# Patient Record
Sex: FEMALE | Race: WHITE | NOT HISPANIC OR LATINO | Employment: OTHER | URBAN - METROPOLITAN AREA
[De-identification: names, ages, dates, MRNs, and addresses within clinical notes are randomized per-mention and may not be internally consistent; named-entity substitution may affect disease eponyms.]

---

## 2017-02-09 ENCOUNTER — INFUSION (OUTPATIENT)
Dept: ONCOLOGY | Facility: HOSPITAL | Age: 82
End: 2017-02-09

## 2017-02-09 VITALS
TEMPERATURE: 99.1 F | HEART RATE: 72 BPM | DIASTOLIC BLOOD PRESSURE: 57 MMHG | WEIGHT: 141 LBS | RESPIRATION RATE: 18 BRPM | SYSTOLIC BLOOD PRESSURE: 152 MMHG | BODY MASS INDEX: 22.08 KG/M2

## 2017-02-09 DIAGNOSIS — D64.9 ANEMIA, UNSPECIFIED TYPE: Primary | Chronic | ICD-10-CM

## 2017-02-09 LAB
ABO GROUP BLD: NORMAL
BLD GP AB SCN SERPL QL: NEGATIVE
RH BLD: NEGATIVE

## 2017-02-09 PROCEDURE — 36430 TRANSFUSION BLD/BLD COMPNT: CPT

## 2017-02-09 PROCEDURE — 96374 THER/PROPH/DIAG INJ IV PUSH: CPT

## 2017-02-09 PROCEDURE — 86901 BLOOD TYPING SEROLOGIC RH(D): CPT

## 2017-02-09 PROCEDURE — 86920 COMPATIBILITY TEST SPIN: CPT

## 2017-02-09 PROCEDURE — 86900 BLOOD TYPING SEROLOGIC ABO: CPT

## 2017-02-09 PROCEDURE — P9016 RBC LEUKOCYTES REDUCED: HCPCS

## 2017-02-09 PROCEDURE — 96375 TX/PRO/DX INJ NEW DRUG ADDON: CPT

## 2017-02-09 PROCEDURE — 25010000002 FUROSEMIDE PER 20 MG: Performed by: INTERNAL MEDICINE

## 2017-02-09 PROCEDURE — 86850 RBC ANTIBODY SCREEN: CPT

## 2017-02-09 RX ORDER — ACETAMINOPHEN 500 MG
1000 TABLET ORAL ONCE
Status: COMPLETED | OUTPATIENT
Start: 2017-02-09 | End: 2017-02-09

## 2017-02-09 RX ORDER — FUROSEMIDE 10 MG/ML
20 INJECTION INTRAMUSCULAR; INTRAVENOUS ONCE
Status: COMPLETED | OUTPATIENT
Start: 2017-02-09 | End: 2017-02-09

## 2017-02-09 RX ORDER — ACETAMINOPHEN 500 MG
1000 TABLET ORAL ONCE
Status: ACTIVE | OUTPATIENT
Start: 2017-02-09

## 2017-02-09 RX ORDER — FUROSEMIDE 10 MG/ML
20 INJECTION INTRAMUSCULAR; INTRAVENOUS ONCE
Status: DISCONTINUED | OUTPATIENT
Start: 2017-02-09 | End: 2017-08-18

## 2017-02-09 RX ADMIN — ACETAMINOPHEN 1000 MG: 500 TABLET, FILM COATED ORAL at 09:36

## 2017-02-09 RX ADMIN — FUROSEMIDE 20 MG: 10 INJECTION, SOLUTION INTRAMUSCULAR; INTRAVENOUS at 13:05

## 2017-02-10 LAB
ABO + RH BLD: NORMAL
ABO + RH BLD: NORMAL
BH BB BLOOD EXPIRATION DATE: NORMAL
BH BB BLOOD EXPIRATION DATE: NORMAL
BH BB BLOOD TYPE BARCODE: 9500
BH BB BLOOD TYPE BARCODE: 9500
BH BB DISPENSE STATUS: NORMAL
BH BB DISPENSE STATUS: NORMAL
BH BB PRODUCT CODE: NORMAL
BH BB PRODUCT CODE: NORMAL
BH BB UNIT NUMBER: NORMAL
BH BB UNIT NUMBER: NORMAL
CROSSMATCH INTERPRETATION: NORMAL
CROSSMATCH INTERPRETATION: NORMAL
UNIT  ABO: NORMAL
UNIT  ABO: NORMAL
UNIT  RH: NORMAL
UNIT  RH: NORMAL

## 2017-03-08 ENCOUNTER — INFUSION (OUTPATIENT)
Dept: ONCOLOGY | Facility: HOSPITAL | Age: 82
End: 2017-03-08

## 2017-03-08 VITALS
HEART RATE: 77 BPM | RESPIRATION RATE: 18 BRPM | BODY MASS INDEX: 22.65 KG/M2 | DIASTOLIC BLOOD PRESSURE: 63 MMHG | TEMPERATURE: 98.7 F | SYSTOLIC BLOOD PRESSURE: 144 MMHG | WEIGHT: 144.6 LBS

## 2017-03-08 DIAGNOSIS — D64.9 ANEMIA, UNSPECIFIED TYPE: Primary | Chronic | ICD-10-CM

## 2017-03-08 LAB
ABO GROUP BLD: NORMAL
BLD GP AB SCN SERPL QL: NEGATIVE
RH BLD: NEGATIVE

## 2017-03-08 PROCEDURE — 36415 COLL VENOUS BLD VENIPUNCTURE: CPT

## 2017-03-08 PROCEDURE — 25010000002 FUROSEMIDE PER 20 MG: Performed by: INTERNAL MEDICINE

## 2017-03-08 PROCEDURE — 86920 COMPATIBILITY TEST SPIN: CPT

## 2017-03-08 PROCEDURE — P9016 RBC LEUKOCYTES REDUCED: HCPCS

## 2017-03-08 PROCEDURE — 36430 TRANSFUSION BLD/BLD COMPNT: CPT

## 2017-03-08 PROCEDURE — 96374 THER/PROPH/DIAG INJ IV PUSH: CPT

## 2017-03-08 PROCEDURE — 86900 BLOOD TYPING SEROLOGIC ABO: CPT

## 2017-03-08 RX ORDER — FUROSEMIDE 10 MG/ML
20 INJECTION INTRAMUSCULAR; INTRAVENOUS ONCE
Status: COMPLETED | OUTPATIENT
Start: 2017-03-08 | End: 2017-03-08

## 2017-03-08 RX ADMIN — FUROSEMIDE 20 MG: 10 INJECTION, SOLUTION INTRAMUSCULAR; INTRAVENOUS at 13:08

## 2017-04-06 ENCOUNTER — INFUSION (OUTPATIENT)
Dept: ONCOLOGY | Facility: HOSPITAL | Age: 82
End: 2017-04-06

## 2017-04-06 VITALS
HEIGHT: 67 IN | DIASTOLIC BLOOD PRESSURE: 68 MMHG | HEART RATE: 73 BPM | WEIGHT: 144 LBS | RESPIRATION RATE: 18 BRPM | BODY MASS INDEX: 22.6 KG/M2 | SYSTOLIC BLOOD PRESSURE: 125 MMHG | TEMPERATURE: 99.2 F | OXYGEN SATURATION: 97 %

## 2017-04-06 DIAGNOSIS — R53.83 FATIGUE, UNSPECIFIED TYPE: ICD-10-CM

## 2017-04-06 DIAGNOSIS — R89.9 ABNORMAL LABORATORY TEST: ICD-10-CM

## 2017-04-06 DIAGNOSIS — D64.9 ANEMIA, UNSPECIFIED TYPE: Primary | Chronic | ICD-10-CM

## 2017-04-06 LAB
ABO GROUP BLD: NORMAL
BLD GP AB SCN SERPL QL: NEGATIVE
FERRITIN SERPL-MCNC: 1404 NG/ML (ref 10–291)
FOLATE SERPL-MCNC: 16.65 NG/ML (ref 3.2–20)
IRON 24H UR-MRATE: 174 MCG/DL (ref 50–175)
IRON SATN MFR SERPL: 85 % (ref 15–50)
RH BLD: NEGATIVE
TIBC SERPL-MCNC: 205 MCG/DL (ref 250–450)
VIT B12 BLD-MCNC: 470 PG/ML (ref 211–911)

## 2017-04-06 PROCEDURE — 96375 TX/PRO/DX INJ NEW DRUG ADDON: CPT

## 2017-04-06 PROCEDURE — 86920 COMPATIBILITY TEST SPIN: CPT

## 2017-04-06 PROCEDURE — 96374 THER/PROPH/DIAG INJ IV PUSH: CPT

## 2017-04-06 PROCEDURE — 86900 BLOOD TYPING SEROLOGIC ABO: CPT

## 2017-04-06 PROCEDURE — P9016 RBC LEUKOCYTES REDUCED: HCPCS

## 2017-04-06 PROCEDURE — 25010000002 FUROSEMIDE PER 20 MG: Performed by: INTERNAL MEDICINE

## 2017-04-06 PROCEDURE — 36430 TRANSFUSION BLD/BLD COMPNT: CPT

## 2017-04-06 RX ORDER — SODIUM CHLORIDE 9 MG/ML
250 INJECTION, SOLUTION INTRAVENOUS AS NEEDED
Status: DISCONTINUED | OUTPATIENT
Start: 2017-04-06 | End: 2017-04-06 | Stop reason: HOSPADM

## 2017-04-06 RX ORDER — ACETAMINOPHEN 500 MG
1000 TABLET ORAL ONCE
Status: COMPLETED | OUTPATIENT
Start: 2017-04-06 | End: 2017-04-06

## 2017-04-06 RX ORDER — FUROSEMIDE 10 MG/ML
20 INJECTION INTRAMUSCULAR; INTRAVENOUS ONCE
Status: COMPLETED | OUTPATIENT
Start: 2017-04-06 | End: 2017-04-06

## 2017-04-06 RX ADMIN — FUROSEMIDE 20 MG: 10 INJECTION, SOLUTION INTRAMUSCULAR; INTRAVENOUS at 14:21

## 2017-04-06 RX ADMIN — ACETAMINOPHEN 1000 MG: 500 TABLET, FILM COATED ORAL at 11:20

## 2017-04-06 NOTE — PATIENT INSTRUCTIONS
Blood Transfusion, Care After  Refer to this sheet in the next few weeks. These instructions provide you with information about caring for yourself after your procedure. Your health care provider may also give you more specific instructions. Your treatment has been planned according to current medical practices, but problems sometimes occur. Call your health care provider if you have any problems or questions after your procedure.  WHAT TO EXPECT AFTER THE PROCEDURE  After your procedure, it is common to have:  · Bruising and soreness at the IV site.  · Chills or fever.  · Headache.  HOME CARE INSTRUCTIONS  · Take medicines only as directed by your health care provider. Ask your health care provider if you can take an over-the-counter pain reliever in case you have a fever or headache a day or two after your transfusion.  · Return to your normal activities as directed by your health care provider.  SEEK MEDICAL CARE IF:   · You develop redness or irritation at your IV site.  · You have persistent fever, chills, or headache.  · Your urine is darker than normal.  · Your urine turns pink, red, or brown.    · The white part of your eye turns yellow (jaundice).    · You feel weak after doing your normal activities.    SEEK IMMEDIATE MEDICAL CARE IF:   · You have trouble breathing.  · You have fever and chills along with:    Anxiety.    Chest or back pain.    Flushed skin.    Clammy skin.    A rapid heartbeat.    Nausea.     This information is not intended to replace advice given to you by your health care provider. Make sure you discuss any questions you have with your health care provider.     Document Released: 01/08/2016 Document Reviewed: 01/08/2016  [employer] Interactive Patient Education ©2016 [employer]

## 2017-04-07 LAB
ABO + RH BLD: NORMAL
BH BB BLOOD EXPIRATION DATE: NORMAL
BH BB BLOOD TYPE BARCODE: 9500
BH BB DISPENSE STATUS: NORMAL
BH BB PRODUCT CODE: NORMAL
BH BB UNIT NUMBER: NORMAL
CROSSMATCH INTERPRETATION: NORMAL
UNIT  ABO: NORMAL
UNIT  RH: NORMAL

## 2017-05-01 ENCOUNTER — HOSPITAL ENCOUNTER (OUTPATIENT)
Dept: GENERAL RADIOLOGY | Facility: HOSPITAL | Age: 82
Discharge: HOME | End: 2017-05-01
Admitting: PHYSICIAN ASSISTANT

## 2017-05-01 ENCOUNTER — TRANSCRIBE ORDERS (OUTPATIENT)
Dept: ADMINISTRATIVE | Facility: HOSPITAL | Age: 82
End: 2017-05-01

## 2017-05-01 DIAGNOSIS — R05.9 COUGH: Primary | ICD-10-CM

## 2017-05-01 DIAGNOSIS — R09.89 CHEST CONGESTION: ICD-10-CM

## 2017-05-01 PROCEDURE — 71020 HC CHEST PA AND LATERAL: CPT

## 2017-05-02 ENCOUNTER — TRANSCRIBE ORDERS (OUTPATIENT)
Dept: ADMINISTRATIVE | Facility: HOSPITAL | Age: 82
End: 2017-05-02

## 2017-05-02 DIAGNOSIS — J90 BILATERAL PLEURAL EFFUSION: ICD-10-CM

## 2017-05-02 DIAGNOSIS — E87.70 HYPERVOLEMIA, UNSPECIFIED HYPERVOLEMIA TYPE: Primary | ICD-10-CM

## 2017-05-03 ENCOUNTER — TRANSCRIBE ORDERS (OUTPATIENT)
Dept: ADMINISTRATIVE | Facility: HOSPITAL | Age: 82
End: 2017-05-03

## 2017-05-03 DIAGNOSIS — I51.89 DIASTOLIC DYSFUNCTION: Primary | ICD-10-CM

## 2017-05-03 DIAGNOSIS — I50.31 CHF (CONGESTIVE HEART FAILURE), NYHA CLASS I, ACUTE, DIASTOLIC (HCC): ICD-10-CM

## 2017-05-04 ENCOUNTER — INFUSION (OUTPATIENT)
Dept: ONCOLOGY | Facility: HOSPITAL | Age: 82
End: 2017-05-04

## 2017-05-04 VITALS
SYSTOLIC BLOOD PRESSURE: 126 MMHG | TEMPERATURE: 99.1 F | BODY MASS INDEX: 21.66 KG/M2 | HEART RATE: 72 BPM | RESPIRATION RATE: 16 BRPM | HEIGHT: 67 IN | WEIGHT: 138 LBS | DIASTOLIC BLOOD PRESSURE: 43 MMHG

## 2017-05-04 DIAGNOSIS — D64.9 ANEMIA, UNSPECIFIED: Primary | ICD-10-CM

## 2017-05-04 LAB
ABO GROUP BLD: NORMAL
BLD GP AB SCN SERPL QL: NEGATIVE
RH BLD: NEGATIVE

## 2017-05-04 PROCEDURE — 86850 RBC ANTIBODY SCREEN: CPT | Performed by: PHYSICIAN ASSISTANT

## 2017-05-04 PROCEDURE — 86900 BLOOD TYPING SEROLOGIC ABO: CPT | Performed by: PHYSICIAN ASSISTANT

## 2017-05-04 PROCEDURE — 86920 COMPATIBILITY TEST SPIN: CPT

## 2017-05-04 PROCEDURE — 25010000002 FUROSEMIDE PER 20 MG: Performed by: PHYSICIAN ASSISTANT

## 2017-05-04 PROCEDURE — 86900 BLOOD TYPING SEROLOGIC ABO: CPT

## 2017-05-04 PROCEDURE — 86901 BLOOD TYPING SEROLOGIC RH(D): CPT | Performed by: PHYSICIAN ASSISTANT

## 2017-05-04 PROCEDURE — 96374 THER/PROPH/DIAG INJ IV PUSH: CPT

## 2017-05-04 PROCEDURE — 36430 TRANSFUSION BLD/BLD COMPNT: CPT

## 2017-05-04 PROCEDURE — P9016 RBC LEUKOCYTES REDUCED: HCPCS

## 2017-05-04 RX ORDER — FUROSEMIDE 10 MG/ML
40 INJECTION INTRAMUSCULAR; INTRAVENOUS ONCE
Status: COMPLETED | OUTPATIENT
Start: 2017-05-04 | End: 2017-05-04

## 2017-05-04 RX ADMIN — FUROSEMIDE 40 MG: 10 INJECTION, SOLUTION INTRAMUSCULAR; INTRAVENOUS at 13:11

## 2017-05-08 ENCOUNTER — TRANSCRIBE ORDERS (OUTPATIENT)
Dept: ADMINISTRATIVE | Facility: HOSPITAL | Age: 82
End: 2017-05-08

## 2017-05-08 ENCOUNTER — HOSPITAL ENCOUNTER (OUTPATIENT)
Dept: GENERAL RADIOLOGY | Facility: HOSPITAL | Age: 82
Discharge: HOME | End: 2017-05-08
Attending: INTERNAL MEDICINE | Admitting: INTERNAL MEDICINE

## 2017-05-08 DIAGNOSIS — K56.60 INTESTINAL OBSTRUCTION, UNSPECIFIED TYPE: ICD-10-CM

## 2017-05-08 DIAGNOSIS — K59.00 CONSTIPATION, UNSPECIFIED CONSTIPATION TYPE: Primary | ICD-10-CM

## 2017-05-08 PROCEDURE — 74000 HC ABDOMEN KUB: CPT

## 2017-05-09 ENCOUNTER — APPOINTMENT (OUTPATIENT)
Dept: CARDIOLOGY | Facility: HOSPITAL | Age: 82
End: 2017-05-09

## 2017-05-18 ENCOUNTER — HOSPITAL ENCOUNTER (OUTPATIENT)
Dept: CARDIOLOGY | Facility: HOSPITAL | Age: 82
Discharge: HOME | End: 2017-05-18
Admitting: PHYSICIAN ASSISTANT

## 2017-05-18 DIAGNOSIS — I51.89 DIASTOLIC DYSFUNCTION: ICD-10-CM

## 2017-05-18 DIAGNOSIS — I50.31 CHF (CONGESTIVE HEART FAILURE), NYHA CLASS I, ACUTE, DIASTOLIC (HCC): ICD-10-CM

## 2017-05-18 LAB
BH CV ECHO MEAS - AO MAX PG (FULL): 33.1 MMHG
BH CV ECHO MEAS - AO MAX PG: 38.9 MMHG
BH CV ECHO MEAS - AO MEAN PG (FULL): 18 MMHG
BH CV ECHO MEAS - AO MEAN PG: 21 MMHG
BH CV ECHO MEAS - AO ROOT AREA (BSA CORRECTED): 1.5
BH CV ECHO MEAS - AO ROOT AREA: 4.5 CM^2
BH CV ECHO MEAS - AO ROOT DIAM: 2.4 CM
BH CV ECHO MEAS - AO V2 MAX: 312 CM/SEC
BH CV ECHO MEAS - AO V2 MEAN: 219 CM/SEC
BH CV ECHO MEAS - AO V2 VTI: 71.5 CM
BH CV ECHO MEAS - AVA(I,A): 1.2 CM^2
BH CV ECHO MEAS - AVA(I,D): 1.2 CM^2
BH CV ECHO MEAS - AVA(V,A): 1.2 CM^2
BH CV ECHO MEAS - AVA(V,D): 1.2 CM^2
BH CV ECHO MEAS - BSA(HAYCOCK): 1.6 M^2
BH CV ECHO MEAS - BSA: 1.6 M^2
BH CV ECHO MEAS - BZI_BMI: 24.7 KILOGRAMS/M^2
BH CV ECHO MEAS - BZI_METRIC_HEIGHT: 157.5 CM
BH CV ECHO MEAS - BZI_METRIC_WEIGHT: 61.2 KG
BH CV ECHO MEAS - CONTRAST EF (2CH): 70.1 ML/M^2
BH CV ECHO MEAS - CONTRAST EF 4CH: 68.3 ML/M^2
BH CV ECHO MEAS - EDV(CUBED): 57.5 ML
BH CV ECHO MEAS - EDV(MOD-SP2): 77 ML
BH CV ECHO MEAS - EDV(MOD-SP4): 63 ML
BH CV ECHO MEAS - EDV(TEICH): 64.3 ML
BH CV ECHO MEAS - EF(CUBED): 64.2 %
BH CV ECHO MEAS - EF(MOD-SP2): 70.1 %
BH CV ECHO MEAS - EF(MOD-SP4): 68.3 %
BH CV ECHO MEAS - EF(TEICH): 56.4 %
BH CV ECHO MEAS - ESV(CUBED): 20.6 ML
BH CV ECHO MEAS - ESV(MOD-SP2): 23 ML
BH CV ECHO MEAS - ESV(MOD-SP4): 20 ML
BH CV ECHO MEAS - ESV(TEICH): 28 ML
BH CV ECHO MEAS - FS: 29 %
BH CV ECHO MEAS - IVS/LVPW: 0.97
BH CV ECHO MEAS - IVSD: 1.2 CM
BH CV ECHO MEAS - LA DIMENSION: 4.7 CM
BH CV ECHO MEAS - LA/AO: 2
BH CV ECHO MEAS - LAT PEAK E' VEL: 8.7 CM/SEC
BH CV ECHO MEAS - LV DIASTOLIC VOL/BSA (35-75): 39 ML/M^2
BH CV ECHO MEAS - LV MASS(C)D: 143.5 GRAMS
BH CV ECHO MEAS - LV MASS(C)DI: 88.7 GRAMS/M^2
BH CV ECHO MEAS - LV MAX PG: 5.9 MMHG
BH CV ECHO MEAS - LV MEAN PG: 3 MMHG
BH CV ECHO MEAS - LV SYSTOLIC VOL/BSA (12-30): 12.4 ML/M^2
BH CV ECHO MEAS - LV V1 MAX: 121 CM/SEC
BH CV ECHO MEAS - LV V1 MEAN: 81.5 CM/SEC
BH CV ECHO MEAS - LV V1 VTI: 27.8 CM
BH CV ECHO MEAS - LVIDD: 3.9 CM
BH CV ECHO MEAS - LVIDS: 2.7 CM
BH CV ECHO MEAS - LVLD AP2: 7.8 CM
BH CV ECHO MEAS - LVLD AP4: 7.1 CM
BH CV ECHO MEAS - LVLS AP2: 5.6 CM
BH CV ECHO MEAS - LVLS AP4: 5.6 CM
BH CV ECHO MEAS - LVOT AREA (M): 3.1 CM^2
BH CV ECHO MEAS - LVOT AREA: 3.1 CM^2
BH CV ECHO MEAS - LVOT DIAM: 2 CM
BH CV ECHO MEAS - LVPWD: 1.2 CM
BH CV ECHO MEAS - MED PEAK E' VEL: 6 CM/SEC
BH CV ECHO MEAS - MV A MAX VEL: 133 CM/SEC
BH CV ECHO MEAS - MV DEC SLOPE: 362 CM/SEC^2
BH CV ECHO MEAS - MV E MAX VEL: 112 CM/SEC
BH CV ECHO MEAS - MV E/A: 0.84
BH CV ECHO MEAS - MV MAX PG: 9.5 MMHG
BH CV ECHO MEAS - MV MEAN PG: 4 MMHG
BH CV ECHO MEAS - MV P1/2T MAX VEL: 129 CM/SEC
BH CV ECHO MEAS - MV P1/2T: 104.4 MSEC
BH CV ECHO MEAS - MV V2 MAX: 154 CM/SEC
BH CV ECHO MEAS - MV V2 MEAN: 91.7 CM/SEC
BH CV ECHO MEAS - MV V2 VTI: 43.8 CM
BH CV ECHO MEAS - MVA P1/2T LCG: 1.7 CM^2
BH CV ECHO MEAS - MVA(P1/2T): 2.1 CM^2
BH CV ECHO MEAS - MVA(VTI): 2 CM^2
BH CV ECHO MEAS - PA ACC SLOPE: 568 CM/SEC^2
BH CV ECHO MEAS - PA ACC TIME: 0.13 SEC
BH CV ECHO MEAS - PA PR(ACCEL): 18.7 MMHG
BH CV ECHO MEAS - RAP SYSTOLE: 3 MMHG
BH CV ECHO MEAS - RVSP: 32 MMHG
BH CV ECHO MEAS - SI(AO): 200 ML/M^2
BH CV ECHO MEAS - SI(CUBED): 22.8 ML/M^2
BH CV ECHO MEAS - SI(LVOT): 54 ML/M^2
BH CV ECHO MEAS - SI(MOD-SP2): 33.4 ML/M^2
BH CV ECHO MEAS - SI(MOD-SP4): 26.6 ML/M^2
BH CV ECHO MEAS - SI(TEICH): 22.4 ML/M^2
BH CV ECHO MEAS - SV(AO): 323.5 ML
BH CV ECHO MEAS - SV(CUBED): 36.9 ML
BH CV ECHO MEAS - SV(LVOT): 87.3 ML
BH CV ECHO MEAS - SV(MOD-SP2): 54 ML
BH CV ECHO MEAS - SV(MOD-SP4): 43 ML
BH CV ECHO MEAS - SV(TEICH): 36.3 ML
BH CV ECHO MEAS - TAPSE (>1.6): 1.5 CM2
BH CV ECHO MEAS - TR MAX VEL: 290 CM/SEC
BH CV XLRA - RV BASE: 3.9 CM
BH CV XLRA - RV LENGTH: 7.1 CM
BH CV XLRA - RV MID: 3.1 CM
BH CV XLRA - TDI S': 15.4 CM/SEC
LV EF 2D ECHO EST: 70 %

## 2017-05-18 PROCEDURE — 93306 TTE W/DOPPLER COMPLETE: CPT

## 2017-05-18 PROCEDURE — 93306 TTE W/DOPPLER COMPLETE: CPT | Performed by: INTERNAL MEDICINE

## 2017-05-24 ENCOUNTER — INFUSION (OUTPATIENT)
Dept: ONCOLOGY | Facility: HOSPITAL | Age: 82
End: 2017-05-24

## 2017-05-24 VITALS
WEIGHT: 137 LBS | BODY MASS INDEX: 21.5 KG/M2 | SYSTOLIC BLOOD PRESSURE: 139 MMHG | RESPIRATION RATE: 18 BRPM | DIASTOLIC BLOOD PRESSURE: 54 MMHG | HEART RATE: 83 BPM | TEMPERATURE: 98.9 F | HEIGHT: 67 IN

## 2017-05-24 DIAGNOSIS — D64.9 ANEMIA, UNSPECIFIED TYPE: Primary | Chronic | ICD-10-CM

## 2017-05-24 LAB
ABO GROUP BLD: NORMAL
BLD GP AB SCN SERPL QL: NEGATIVE
HCT VFR BLD AUTO: 26.4 % (ref 34.5–44)
HGB BLD-MCNC: 8.5 G/DL (ref 11.5–15.5)
RH BLD: NEGATIVE

## 2017-05-24 PROCEDURE — 96374 THER/PROPH/DIAG INJ IV PUSH: CPT

## 2017-05-24 PROCEDURE — 86920 COMPATIBILITY TEST SPIN: CPT

## 2017-05-24 PROCEDURE — 86901 BLOOD TYPING SEROLOGIC RH(D): CPT

## 2017-05-24 PROCEDURE — 86900 BLOOD TYPING SEROLOGIC ABO: CPT

## 2017-05-24 PROCEDURE — 85018 HEMOGLOBIN: CPT

## 2017-05-24 PROCEDURE — P9016 RBC LEUKOCYTES REDUCED: HCPCS

## 2017-05-24 PROCEDURE — 85014 HEMATOCRIT: CPT

## 2017-05-24 PROCEDURE — 36430 TRANSFUSION BLD/BLD COMPNT: CPT

## 2017-05-24 PROCEDURE — 86850 RBC ANTIBODY SCREEN: CPT

## 2017-05-24 PROCEDURE — 25010000002 FUROSEMIDE PER 20 MG: Performed by: INTERNAL MEDICINE

## 2017-05-24 RX ORDER — FUROSEMIDE 10 MG/ML
20 INJECTION INTRAMUSCULAR; INTRAVENOUS ONCE
Status: COMPLETED | OUTPATIENT
Start: 2017-05-24 | End: 2017-05-24

## 2017-05-24 RX ORDER — ACETAMINOPHEN 500 MG
1000 TABLET ORAL ONCE
Status: COMPLETED | OUTPATIENT
Start: 2017-05-24 | End: 2017-05-24

## 2017-05-24 RX ORDER — SODIUM CHLORIDE 9 MG/ML
250 INJECTION, SOLUTION INTRAVENOUS AS NEEDED
Status: DISCONTINUED | OUTPATIENT
Start: 2017-05-24 | End: 2017-05-24 | Stop reason: HOSPADM

## 2017-05-24 RX ADMIN — FUROSEMIDE 20 MG: 10 INJECTION, SOLUTION INTRAMUSCULAR; INTRAVENOUS at 12:55

## 2017-05-24 RX ADMIN — ACETAMINOPHEN 1000 MG: 500 TABLET, FILM COATED ORAL at 09:59

## 2017-06-22 ENCOUNTER — INFUSION (OUTPATIENT)
Dept: ONCOLOGY | Facility: HOSPITAL | Age: 82
End: 2017-06-22

## 2017-06-22 VITALS
SYSTOLIC BLOOD PRESSURE: 140 MMHG | TEMPERATURE: 97.3 F | RESPIRATION RATE: 18 BRPM | BODY MASS INDEX: 21.5 KG/M2 | HEIGHT: 67 IN | WEIGHT: 137 LBS | DIASTOLIC BLOOD PRESSURE: 89 MMHG | HEART RATE: 82 BPM

## 2017-06-22 DIAGNOSIS — D64.9 ANEMIA, UNSPECIFIED TYPE: Primary | Chronic | ICD-10-CM

## 2017-06-22 LAB
ABO GROUP BLD: NORMAL
BLD GP AB SCN SERPL QL: NEGATIVE
ERYTHROCYTE [DISTWIDTH] IN BLOOD BY AUTOMATED COUNT: 28.3 % (ref 11.3–14.5)
HCT VFR BLD AUTO: 26.3 % (ref 34.5–44)
HGB BLD-MCNC: 8.7 G/DL (ref 11.5–15.5)
MCH RBC QN AUTO: 32.4 PG (ref 27–31)
MCHC RBC AUTO-ENTMCNC: 33 G/DL (ref 32–36)
MCV RBC AUTO: 98.1 FL (ref 80–99)
PLATELET # BLD AUTO: 163 10*3/MM3 (ref 150–450)
PMV BLD AUTO: 9.8 FL (ref 6–12)
RBC # BLD AUTO: 2.68 10*6/MM3 (ref 3.89–5.14)
RH BLD: NEGATIVE
WBC NRBC COR # BLD: 5.2 10*3/MM3 (ref 3.5–10.8)

## 2017-06-22 PROCEDURE — 86900 BLOOD TYPING SEROLOGIC ABO: CPT

## 2017-06-22 PROCEDURE — 36430 TRANSFUSION BLD/BLD COMPNT: CPT

## 2017-06-22 PROCEDURE — 85027 COMPLETE CBC AUTOMATED: CPT

## 2017-06-22 PROCEDURE — 86920 COMPATIBILITY TEST SPIN: CPT

## 2017-06-22 PROCEDURE — 86850 RBC ANTIBODY SCREEN: CPT

## 2017-06-22 PROCEDURE — P9016 RBC LEUKOCYTES REDUCED: HCPCS

## 2017-06-22 PROCEDURE — 86901 BLOOD TYPING SEROLOGIC RH(D): CPT

## 2017-06-22 RX ORDER — ACETAMINOPHEN 500 MG
1000 TABLET ORAL ONCE
Status: COMPLETED | OUTPATIENT
Start: 2017-06-22 | End: 2017-06-22

## 2017-06-22 RX ADMIN — ACETAMINOPHEN 1000 MG: 500 TABLET ORAL at 10:11

## 2017-08-18 ENCOUNTER — INFUSION (OUTPATIENT)
Dept: ONCOLOGY | Facility: HOSPITAL | Age: 82
End: 2017-08-18

## 2017-08-18 VITALS
HEIGHT: 67 IN | DIASTOLIC BLOOD PRESSURE: 49 MMHG | HEART RATE: 79 BPM | TEMPERATURE: 98.2 F | RESPIRATION RATE: 18 BRPM | SYSTOLIC BLOOD PRESSURE: 125 MMHG | BODY MASS INDEX: 21.66 KG/M2 | WEIGHT: 138 LBS

## 2017-08-18 DIAGNOSIS — D64.9 ANEMIA, UNSPECIFIED TYPE: Primary | Chronic | ICD-10-CM

## 2017-08-18 LAB
ABO GROUP BLD: NORMAL
BLD GP AB SCN SERPL QL: NEGATIVE
HCT VFR BLD AUTO: 25.6 % (ref 34.5–44)
HGB BLD-MCNC: 8.2 G/DL (ref 11.5–15.5)
RH BLD: NEGATIVE

## 2017-08-18 PROCEDURE — 86900 BLOOD TYPING SEROLOGIC ABO: CPT

## 2017-08-18 PROCEDURE — P9016 RBC LEUKOCYTES REDUCED: HCPCS

## 2017-08-18 PROCEDURE — 36430 TRANSFUSION BLD/BLD COMPNT: CPT

## 2017-08-18 PROCEDURE — 86901 BLOOD TYPING SEROLOGIC RH(D): CPT

## 2017-08-18 PROCEDURE — 86850 RBC ANTIBODY SCREEN: CPT

## 2017-08-18 PROCEDURE — 85018 HEMOGLOBIN: CPT

## 2017-08-18 PROCEDURE — 86920 COMPATIBILITY TEST SPIN: CPT

## 2017-08-18 PROCEDURE — 85014 HEMATOCRIT: CPT

## 2017-08-18 RX ORDER — ACETAMINOPHEN 500 MG
1000 TABLET ORAL ONCE
Status: COMPLETED | OUTPATIENT
Start: 2017-08-18 | End: 2017-08-18

## 2017-08-18 RX ADMIN — ACETAMINOPHEN 1000 MG: 500 TABLET ORAL at 09:26

## 2017-09-14 RX ORDER — ACETAMINOPHEN 500 MG
1000 TABLET ORAL ONCE
Status: COMPLETED | OUTPATIENT
Start: 2017-09-15 | End: 2017-09-15

## 2017-09-15 ENCOUNTER — INFUSION (OUTPATIENT)
Dept: ONCOLOGY | Facility: HOSPITAL | Age: 82
End: 2017-09-15

## 2017-09-15 VITALS
HEART RATE: 83 BPM | SYSTOLIC BLOOD PRESSURE: 162 MMHG | RESPIRATION RATE: 20 BRPM | BODY MASS INDEX: 21.5 KG/M2 | HEIGHT: 67 IN | DIASTOLIC BLOOD PRESSURE: 54 MMHG | WEIGHT: 137 LBS | TEMPERATURE: 99.4 F

## 2017-09-15 DIAGNOSIS — D64.9 ANEMIA, UNSPECIFIED TYPE: Primary | ICD-10-CM

## 2017-09-15 LAB
ABO GROUP BLD: NORMAL
BLD GP AB SCN SERPL QL: NEGATIVE
RH BLD: NEGATIVE

## 2017-09-15 PROCEDURE — 36430 TRANSFUSION BLD/BLD COMPNT: CPT

## 2017-09-15 PROCEDURE — 86900 BLOOD TYPING SEROLOGIC ABO: CPT

## 2017-09-15 PROCEDURE — 86923 COMPATIBILITY TEST ELECTRIC: CPT

## 2017-09-15 PROCEDURE — P9016 RBC LEUKOCYTES REDUCED: HCPCS

## 2017-09-15 PROCEDURE — 86901 BLOOD TYPING SEROLOGIC RH(D): CPT

## 2017-09-15 PROCEDURE — 86850 RBC ANTIBODY SCREEN: CPT

## 2017-09-15 RX ADMIN — ACETAMINOPHEN 1000 MG: 500 TABLET ORAL at 08:56

## 2017-09-16 LAB
ABO + RH BLD: NORMAL
BH BB BLOOD EXPIRATION DATE: NORMAL
BH BB BLOOD TYPE BARCODE: 9500
BH BB DISPENSE STATUS: NORMAL
BH BB PRODUCT CODE: NORMAL
BH BB UNIT NUMBER: NORMAL
UNIT  ABO: NORMAL
UNIT  RH: NORMAL

## 2017-10-25 ENCOUNTER — INFUSION (OUTPATIENT)
Dept: ONCOLOGY | Facility: HOSPITAL | Age: 82
End: 2017-10-25

## 2017-10-25 VITALS
WEIGHT: 137 LBS | SYSTOLIC BLOOD PRESSURE: 135 MMHG | HEART RATE: 85 BPM | RESPIRATION RATE: 20 BRPM | BODY MASS INDEX: 21.5 KG/M2 | DIASTOLIC BLOOD PRESSURE: 50 MMHG | HEIGHT: 67 IN | TEMPERATURE: 98.6 F

## 2017-10-25 DIAGNOSIS — D64.9 ANEMIA, UNSPECIFIED TYPE: Primary | Chronic | ICD-10-CM

## 2017-10-25 LAB
ABO GROUP BLD: NORMAL
BLD GP AB SCN SERPL QL: NEGATIVE
HCT VFR BLD AUTO: 26.8 % (ref 34.5–44)
HGB BLD-MCNC: 8.5 G/DL (ref 11.5–15.5)
RH BLD: NEGATIVE

## 2017-10-25 PROCEDURE — 86901 BLOOD TYPING SEROLOGIC RH(D): CPT

## 2017-10-25 PROCEDURE — 85014 HEMATOCRIT: CPT

## 2017-10-25 PROCEDURE — 85018 HEMOGLOBIN: CPT

## 2017-10-25 PROCEDURE — 36430 TRANSFUSION BLD/BLD COMPNT: CPT

## 2017-10-25 PROCEDURE — 25010000002 FUROSEMIDE PER 20 MG: Performed by: PHYSICIAN ASSISTANT

## 2017-10-25 PROCEDURE — 86923 COMPATIBILITY TEST ELECTRIC: CPT

## 2017-10-25 PROCEDURE — 86900 BLOOD TYPING SEROLOGIC ABO: CPT

## 2017-10-25 PROCEDURE — 96374 THER/PROPH/DIAG INJ IV PUSH: CPT

## 2017-10-25 PROCEDURE — 86850 RBC ANTIBODY SCREEN: CPT

## 2017-10-25 PROCEDURE — 96375 TX/PRO/DX INJ NEW DRUG ADDON: CPT

## 2017-10-25 PROCEDURE — P9016 RBC LEUKOCYTES REDUCED: HCPCS

## 2017-10-25 RX ORDER — FUROSEMIDE 10 MG/ML
20 INJECTION INTRAMUSCULAR; INTRAVENOUS ONCE
Status: COMPLETED | OUTPATIENT
Start: 2017-10-25 | End: 2017-10-25

## 2017-10-25 RX ORDER — ACETAMINOPHEN 500 MG
500 TABLET ORAL ONCE
Status: COMPLETED | OUTPATIENT
Start: 2017-10-25 | End: 2017-10-25

## 2017-10-25 RX ADMIN — FUROSEMIDE 20 MG: 10 INJECTION, SOLUTION INTRAMUSCULAR; INTRAVENOUS at 12:29

## 2017-10-25 RX ADMIN — ACETAMINOPHEN 500 MG: 500 TABLET ORAL at 09:48

## 2017-11-08 ENCOUNTER — TRANSCRIBE ORDERS (OUTPATIENT)
Dept: ADMINISTRATIVE | Facility: HOSPITAL | Age: 82
End: 2017-11-08

## 2017-11-08 ENCOUNTER — HOSPITAL ENCOUNTER (OUTPATIENT)
Dept: GENERAL RADIOLOGY | Facility: HOSPITAL | Age: 82
Discharge: HOME | End: 2017-11-08
Admitting: PHYSICIAN ASSISTANT

## 2017-11-08 DIAGNOSIS — M54.5 LOW BACK PAIN, UNSPECIFIED BACK PAIN LATERALITY, UNSPECIFIED CHRONICITY, WITH SCIATICA PRESENCE UNSPECIFIED: ICD-10-CM

## 2017-11-08 DIAGNOSIS — R05.9 COUGH: Primary | ICD-10-CM

## 2017-11-08 PROCEDURE — 71020 HC CHEST PA AND LATERAL: CPT

## 2017-11-08 PROCEDURE — 72100 X-RAY EXAM L-S SPINE 2/3 VWS: CPT

## 2017-11-10 ENCOUNTER — HOSPITAL ENCOUNTER (EMERGENCY)
Facility: HOSPITAL | Age: 82
Discharge: HOME | End: 2017-11-10
Attending: EMERGENCY MEDICINE | Admitting: EMERGENCY MEDICINE

## 2017-11-10 ENCOUNTER — APPOINTMENT (OUTPATIENT)
Dept: CT IMAGING | Facility: HOSPITAL | Age: 82
End: 2017-11-10

## 2017-11-10 VITALS
WEIGHT: 142 LBS | OXYGEN SATURATION: 96 % | BODY MASS INDEX: 23.66 KG/M2 | SYSTOLIC BLOOD PRESSURE: 159 MMHG | DIASTOLIC BLOOD PRESSURE: 77 MMHG | RESPIRATION RATE: 16 BRPM | HEART RATE: 83 BPM | TEMPERATURE: 98 F | HEIGHT: 65 IN

## 2017-11-10 DIAGNOSIS — K59.00 CONSTIPATION, UNSPECIFIED CONSTIPATION TYPE: Primary | ICD-10-CM

## 2017-11-10 LAB
DEVELOPER EXPIRATION DATE: NORMAL
DEVELOPER LOT NUMBER: NORMAL
EXPIRATION DATE: NORMAL
FECAL OCCULT BLOOD SCREEN, POC: NEGATIVE
Lab: NORMAL
NEGATIVE CONTROL: NEGATIVE
POSITIVE CONTROL: POSITIVE

## 2017-11-10 PROCEDURE — 82270 OCCULT BLOOD FECES: CPT | Performed by: PHYSICIAN ASSISTANT

## 2017-11-10 PROCEDURE — 74176 CT ABD & PELVIS W/O CONTRAST: CPT

## 2017-11-10 PROCEDURE — 99284 EMERGENCY DEPT VISIT MOD MDM: CPT

## 2017-11-10 RX ORDER — CEFDINIR 300 MG/1
300 CAPSULE ORAL 2 TIMES DAILY
COMMUNITY

## 2017-11-10 RX ORDER — POLYETHYLENE GLYCOL 3350 17 G/17G
17 POWDER, FOR SOLUTION ORAL DAILY
COMMUNITY

## 2017-11-10 RX ORDER — TRAMADOL HYDROCHLORIDE 50 MG/1
50 TABLET ORAL ONCE
Status: COMPLETED | OUTPATIENT
Start: 2017-11-10 | End: 2017-11-10

## 2017-11-10 RX ORDER — SENNA AND DOCUSATE SODIUM 50; 8.6 MG/1; MG/1
1 TABLET, FILM COATED ORAL 2 TIMES DAILY
COMMUNITY

## 2017-11-10 RX ORDER — ALBUTEROL SULFATE 2.5 MG/3ML
2.5 SOLUTION RESPIRATORY (INHALATION) EVERY 4 HOURS PRN
COMMUNITY

## 2017-11-10 RX ADMIN — TRAMADOL HYDROCHLORIDE 50 MG: 50 TABLET, COATED ORAL at 22:12

## 2017-11-11 NOTE — ED PROVIDER NOTES
Subjective   Patient is a 89 y.o. female presenting with constipation.   History provided by:  Patient   used: No    Constipation   Severity:  Moderate  Time since last bowel movement:  7 days  Timing:  Constant  Chronicity:  Recurrent  Context: medication    Stool description:  Hard and formed  Relieved by:  Nothing  Worsened by:  Nothing  Ineffective treatments:  Diet changes, enemas, fiber, Miralax and laxatives  Associated symptoms: flatus    Associated symptoms: no abdominal pain, no back pain, no diarrhea, no dysuria, no fever, no hematochezia, no nausea and no vomiting    Risk factors: no hx of abdominal surgery, no recent illness and no recent surgery        Review of Systems   Constitutional: Negative for chills and fever.   Respiratory: Negative for chest tightness and shortness of breath.    Cardiovascular: Negative for chest pain.   Gastrointestinal: Positive for constipation and flatus. Negative for abdominal pain, diarrhea, hematochezia, nausea and vomiting.   Genitourinary: Negative for dysuria, flank pain, hematuria and urgency.   Musculoskeletal: Negative for back pain.   Neurological: Negative for dizziness and weakness.   Psychiatric/Behavioral: Negative.    All other systems reviewed and are negative.      Past Medical History:   Diagnosis Date   • Anemia     macrocytic   • Arthritis    • Atrial fibrillation    • CHF (congestive heart failure)    • Depression    • Disease of thyroid gland    • Hypertension    • Insomnia    • Lower extremity edema        No Known Allergies    Past Surgical History:   Procedure Laterality Date   • HYSTERECTOMY         History reviewed. No pertinent family history.    Social History     Social History   • Marital status:      Spouse name: N/A   • Number of children: N/A   • Years of education: N/A     Social History Main Topics   • Smoking status: Never Smoker   • Smokeless tobacco: Never Used   • Alcohol use No   • Drug use: No   • Sexual  activity: Defer     Other Topics Concern   • None     Social History Narrative           Objective   Physical Exam   Constitutional: She is oriented to person, place, and time. She appears well-developed and well-nourished.   HENT:   Head: Normocephalic and atraumatic.   Right Ear: External ear normal.   Left Ear: External ear normal.   Nose: Nose normal.   Mouth/Throat: Oropharynx is clear and moist.   Eyes: Conjunctivae and EOM are normal. Pupils are equal, round, and reactive to light. No scleral icterus.   Neck: Normal range of motion. No thyromegaly present.   Cardiovascular: Normal rate, regular rhythm and normal heart sounds.    Pulmonary/Chest: Effort normal and breath sounds normal. No respiratory distress. She has no wheezes. She has no rales. She exhibits no tenderness.   Abdominal: Soft. Bowel sounds are normal. She exhibits no distension. There is no tenderness.   Genitourinary:   Genitourinary Comments: Rectal examination cannot palpate any stool should good sphincter tone no masses.   Musculoskeletal: Normal range of motion.   Lymphadenopathy:     She has no cervical adenopathy.   Neurological: She is alert and oriented to person, place, and time. She has normal reflexes. She displays normal reflexes. No cranial nerve deficit. Coordination normal.   Skin: Skin is warm and dry.   Psychiatric: She has a normal mood and affect. Her behavior is normal. Judgment and thought content normal.   Nursing note and vitals reviewed.      Procedures         ED Course  ED Course   Comment By Time   Patient was given of milk and molasses enema here with very little relief.  CAT scan was since she unremarkable. MARU Graham 11/10 2125                  [facility]      Final diagnoses:   Constipation, unspecified constipation type            MARU Graham  11/10/17 2220

## 2017-11-17 ENCOUNTER — LAB REQUISITION (OUTPATIENT)
Dept: LAB | Facility: HOSPITAL | Age: 82
End: 2017-11-17

## 2017-11-17 DIAGNOSIS — R41.0 DISORIENTATION: ICD-10-CM

## 2017-11-17 DIAGNOSIS — R06.00 DYSPNEA: ICD-10-CM

## 2017-11-17 DIAGNOSIS — I50.83 HIGH OUTPUT HEART FAILURE (HCC): ICD-10-CM

## 2017-11-17 DIAGNOSIS — D64.9 ANEMIA: ICD-10-CM

## 2017-11-17 LAB — BNP SERPL-MCNC: 502 PG/ML (ref 0–100)

## 2017-11-17 PROCEDURE — 83880 ASSAY OF NATRIURETIC PEPTIDE: CPT

## 2017-11-20 ENCOUNTER — INFUSION (OUTPATIENT)
Dept: ONCOLOGY | Facility: HOSPITAL | Age: 82
End: 2017-11-20

## 2017-11-20 VITALS
WEIGHT: 137 LBS | SYSTOLIC BLOOD PRESSURE: 143 MMHG | BODY MASS INDEX: 25.21 KG/M2 | HEART RATE: 75 BPM | RESPIRATION RATE: 18 BRPM | DIASTOLIC BLOOD PRESSURE: 61 MMHG | HEIGHT: 62 IN | TEMPERATURE: 98.7 F

## 2017-11-20 DIAGNOSIS — D64.9 ANEMIA, UNSPECIFIED TYPE: Primary | Chronic | ICD-10-CM

## 2017-11-20 LAB
ABO GROUP BLD: NORMAL
BLD GP AB SCN SERPL QL: NEGATIVE
RH BLD: NEGATIVE

## 2017-11-20 PROCEDURE — 86900 BLOOD TYPING SEROLOGIC ABO: CPT

## 2017-11-20 PROCEDURE — 25010000002 FUROSEMIDE PER 20 MG: Performed by: INTERNAL MEDICINE

## 2017-11-20 PROCEDURE — 86923 COMPATIBILITY TEST ELECTRIC: CPT

## 2017-11-20 PROCEDURE — 86901 BLOOD TYPING SEROLOGIC RH(D): CPT

## 2017-11-20 PROCEDURE — P9016 RBC LEUKOCYTES REDUCED: HCPCS

## 2017-11-20 PROCEDURE — 36430 TRANSFUSION BLD/BLD COMPNT: CPT

## 2017-11-20 PROCEDURE — 96374 THER/PROPH/DIAG INJ IV PUSH: CPT

## 2017-11-20 PROCEDURE — 86850 RBC ANTIBODY SCREEN: CPT

## 2017-11-20 RX ORDER — FUROSEMIDE 10 MG/ML
20 INJECTION INTRAMUSCULAR; INTRAVENOUS ONCE
Status: CANCELLED | OUTPATIENT
Start: 2017-11-20 | End: 2017-11-20

## 2017-11-20 RX ORDER — FUROSEMIDE 10 MG/ML
20 INJECTION INTRAMUSCULAR; INTRAVENOUS ONCE
Status: COMPLETED | OUTPATIENT
Start: 2017-11-20 | End: 2017-11-20

## 2017-11-20 RX ADMIN — FUROSEMIDE 20 MG: 10 INJECTION, SOLUTION INTRAMUSCULAR; INTRAVENOUS at 13:43

## 2017-11-21 ENCOUNTER — INFUSION (OUTPATIENT)
Dept: ONCOLOGY | Facility: HOSPITAL | Age: 82
End: 2017-11-21

## 2017-11-21 VITALS
HEIGHT: 62 IN | WEIGHT: 134 LBS | SYSTOLIC BLOOD PRESSURE: 157 MMHG | BODY MASS INDEX: 24.66 KG/M2 | TEMPERATURE: 99.1 F | DIASTOLIC BLOOD PRESSURE: 64 MMHG | HEART RATE: 79 BPM | RESPIRATION RATE: 18 BRPM

## 2017-11-21 DIAGNOSIS — D64.9 ANEMIA, UNSPECIFIED TYPE: Chronic | ICD-10-CM

## 2017-11-21 PROCEDURE — 96374 THER/PROPH/DIAG INJ IV PUSH: CPT

## 2017-11-21 PROCEDURE — 25010000002 FUROSEMIDE PER 20 MG: Performed by: INTERNAL MEDICINE

## 2017-11-21 PROCEDURE — 36430 TRANSFUSION BLD/BLD COMPNT: CPT

## 2017-11-21 PROCEDURE — P9016 RBC LEUKOCYTES REDUCED: HCPCS

## 2017-11-21 PROCEDURE — 86900 BLOOD TYPING SEROLOGIC ABO: CPT

## 2017-11-21 RX ORDER — FUROSEMIDE 10 MG/ML
20 INJECTION INTRAMUSCULAR; INTRAVENOUS ONCE
Status: COMPLETED | OUTPATIENT
Start: 2017-11-21 | End: 2017-11-21

## 2017-11-21 RX ADMIN — FUROSEMIDE 20 MG: 10 INJECTION, SOLUTION INTRAMUSCULAR; INTRAVENOUS at 10:27

## 2017-11-22 ENCOUNTER — LAB REQUISITION (OUTPATIENT)
Dept: LAB | Facility: HOSPITAL | Age: 82
End: 2017-11-22

## 2017-11-22 DIAGNOSIS — Z00.00 ROUTINE GENERAL MEDICAL EXAMINATION AT A HEALTH CARE FACILITY: ICD-10-CM

## 2017-11-22 LAB
ABO + RH BLD: NORMAL
ABO + RH BLD: NORMAL
ANION GAP SERPL CALCULATED.3IONS-SCNC: 8 MMOL/L (ref 3–11)
BASOPHILS # BLD AUTO: 0.04 10*3/MM3 (ref 0–0.2)
BASOPHILS NFR BLD AUTO: 0.5 % (ref 0–1)
BH BB BLOOD EXPIRATION DATE: NORMAL
BH BB BLOOD EXPIRATION DATE: NORMAL
BH BB BLOOD TYPE BARCODE: 9500
BH BB BLOOD TYPE BARCODE: 9500
BH BB DISPENSE STATUS: NORMAL
BH BB DISPENSE STATUS: NORMAL
BH BB PRODUCT CODE: NORMAL
BH BB PRODUCT CODE: NORMAL
BH BB UNIT NUMBER: NORMAL
BH BB UNIT NUMBER: NORMAL
BNP SERPL-MCNC: 235 PG/ML (ref 0–100)
BUN BLD-MCNC: 21 MG/DL (ref 9–23)
BUN/CREAT SERPL: 26.3 (ref 7–25)
CALCIUM SPEC-SCNC: 9.2 MG/DL (ref 8.7–10.4)
CHLORIDE SERPL-SCNC: 104 MMOL/L (ref 99–109)
CO2 SERPL-SCNC: 32 MMOL/L (ref 20–31)
CREAT BLD-MCNC: 0.8 MG/DL (ref 0.6–1.3)
DEPRECATED RDW RBC AUTO: 80.4 FL (ref 37–54)
EOSINOPHIL # BLD AUTO: 0.12 10*3/MM3 (ref 0–0.3)
EOSINOPHIL NFR BLD AUTO: 1.5 % (ref 0–3)
ERYTHROCYTE [DISTWIDTH] IN BLOOD BY AUTOMATED COUNT: 22.1 % (ref 11.3–14.5)
GFR SERPL CREATININE-BSD FRML MDRD: 67 ML/MIN/1.73
GLUCOSE BLD-MCNC: 97 MG/DL (ref 70–100)
HCT VFR BLD AUTO: 36.3 % (ref 34.5–44)
HGB BLD-MCNC: 11.8 G/DL (ref 11.5–15.5)
IMM GRANULOCYTES # BLD: 0.02 10*3/MM3 (ref 0–0.03)
IMM GRANULOCYTES NFR BLD: 0.3 % (ref 0–0.6)
LYMPHOCYTES # BLD AUTO: 0.99 10*3/MM3 (ref 0.6–4.8)
LYMPHOCYTES NFR BLD AUTO: 12.7 % (ref 24–44)
MCH RBC QN AUTO: 33.6 PG (ref 27–31)
MCHC RBC AUTO-ENTMCNC: 32.5 G/DL (ref 32–36)
MCV RBC AUTO: 103.4 FL (ref 80–99)
MONOCYTES # BLD AUTO: 0.55 10*3/MM3 (ref 0–1)
MONOCYTES NFR BLD AUTO: 7.1 % (ref 0–12)
NEUTROPHILS # BLD AUTO: 6.06 10*3/MM3 (ref 1.5–8.3)
NEUTROPHILS NFR BLD AUTO: 77.9 % (ref 41–71)
PLATELET # BLD AUTO: 278 10*3/MM3 (ref 150–450)
PMV BLD AUTO: 11.8 FL (ref 6–12)
POTASSIUM BLD-SCNC: 4.2 MMOL/L (ref 3.5–5.5)
RBC # BLD AUTO: 3.51 10*6/MM3 (ref 3.89–5.14)
SODIUM BLD-SCNC: 144 MMOL/L (ref 132–146)
UNIT  ABO: NORMAL
UNIT  ABO: NORMAL
UNIT  RH: NORMAL
UNIT  RH: NORMAL
WBC NRBC COR # BLD: 7.78 10*3/MM3 (ref 3.5–10.8)

## 2017-11-22 PROCEDURE — 85025 COMPLETE CBC W/AUTO DIFF WBC: CPT | Performed by: PHYSICIAN ASSISTANT

## 2017-11-22 PROCEDURE — 80048 BASIC METABOLIC PNL TOTAL CA: CPT | Performed by: PHYSICIAN ASSISTANT

## 2017-11-22 PROCEDURE — 83880 ASSAY OF NATRIURETIC PEPTIDE: CPT | Performed by: PHYSICIAN ASSISTANT

## 2017-12-11 ENCOUNTER — INFUSION (OUTPATIENT)
Dept: ONCOLOGY | Facility: HOSPITAL | Age: 82
End: 2017-12-11

## 2017-12-11 VITALS
WEIGHT: 129 LBS | BODY MASS INDEX: 23.59 KG/M2 | TEMPERATURE: 99.1 F | DIASTOLIC BLOOD PRESSURE: 48 MMHG | RESPIRATION RATE: 18 BRPM | HEART RATE: 82 BPM | SYSTOLIC BLOOD PRESSURE: 108 MMHG

## 2017-12-11 DIAGNOSIS — D64.9 RELATIVE ANEMIA: Primary | ICD-10-CM

## 2017-12-11 LAB
ABO GROUP BLD: NORMAL
BLD GP AB SCN SERPL QL: NEGATIVE
HCT VFR BLD AUTO: 28.8 % (ref 34.5–44)
HGB BLD-MCNC: 9.2 G/DL (ref 11.5–15.5)
RH BLD: NEGATIVE

## 2017-12-11 PROCEDURE — 86900 BLOOD TYPING SEROLOGIC ABO: CPT

## 2017-12-11 PROCEDURE — 86850 RBC ANTIBODY SCREEN: CPT

## 2017-12-11 PROCEDURE — 85014 HEMATOCRIT: CPT

## 2017-12-11 PROCEDURE — 86901 BLOOD TYPING SEROLOGIC RH(D): CPT

## 2017-12-11 PROCEDURE — 25010000002 FUROSEMIDE PER 20 MG: Performed by: PHYSICIAN ASSISTANT

## 2017-12-11 PROCEDURE — 96374 THER/PROPH/DIAG INJ IV PUSH: CPT

## 2017-12-11 PROCEDURE — 36430 TRANSFUSION BLD/BLD COMPNT: CPT

## 2017-12-11 PROCEDURE — 85018 HEMOGLOBIN: CPT

## 2017-12-11 PROCEDURE — 86923 COMPATIBILITY TEST ELECTRIC: CPT

## 2017-12-11 PROCEDURE — P9016 RBC LEUKOCYTES REDUCED: HCPCS

## 2017-12-11 RX ORDER — ACETAMINOPHEN 500 MG
1000 TABLET ORAL ONCE
Status: COMPLETED | OUTPATIENT
Start: 2017-12-11 | End: 2017-12-11

## 2017-12-11 RX ORDER — FUROSEMIDE 10 MG/ML
20 INJECTION INTRAMUSCULAR; INTRAVENOUS ONCE
Status: COMPLETED | OUTPATIENT
Start: 2017-12-11 | End: 2017-12-11

## 2017-12-11 RX ADMIN — ACETAMINOPHEN 1000 MG: 500 TABLET ORAL at 09:04

## 2017-12-11 RX ADMIN — FUROSEMIDE 20 MG: 10 INJECTION, SOLUTION INTRAMUSCULAR; INTRAVENOUS at 12:17

## 2017-12-18 ENCOUNTER — INFUSION (OUTPATIENT)
Dept: ONCOLOGY | Facility: HOSPITAL | Age: 82
End: 2017-12-18

## 2017-12-18 VITALS — TEMPERATURE: 99.8 F | SYSTOLIC BLOOD PRESSURE: 126 MMHG | DIASTOLIC BLOOD PRESSURE: 40 MMHG | HEART RATE: 78 BPM

## 2017-12-18 DIAGNOSIS — D64.9 ANEMIA, UNSPECIFIED TYPE: Primary | Chronic | ICD-10-CM

## 2017-12-18 LAB
ABO GROUP BLD: NORMAL
BLD GP AB SCN SERPL QL: NEGATIVE
HCT VFR BLD AUTO: 32 % (ref 34.5–44)
HGB BLD-MCNC: 10 G/DL (ref 11.5–15.5)
RH BLD: NEGATIVE

## 2017-12-18 PROCEDURE — 86850 RBC ANTIBODY SCREEN: CPT

## 2017-12-18 PROCEDURE — 85018 HEMOGLOBIN: CPT

## 2017-12-18 PROCEDURE — 36430 TRANSFUSION BLD/BLD COMPNT: CPT

## 2017-12-18 PROCEDURE — 96375 TX/PRO/DX INJ NEW DRUG ADDON: CPT

## 2017-12-18 PROCEDURE — 85014 HEMATOCRIT: CPT

## 2017-12-18 PROCEDURE — 86901 BLOOD TYPING SEROLOGIC RH(D): CPT

## 2017-12-18 PROCEDURE — 86900 BLOOD TYPING SEROLOGIC ABO: CPT

## 2017-12-18 PROCEDURE — 96374 THER/PROPH/DIAG INJ IV PUSH: CPT

## 2017-12-18 PROCEDURE — 25010000002 FUROSEMIDE PER 20 MG: Performed by: PHYSICIAN ASSISTANT

## 2017-12-18 PROCEDURE — P9016 RBC LEUKOCYTES REDUCED: HCPCS

## 2017-12-18 PROCEDURE — 86923 COMPATIBILITY TEST ELECTRIC: CPT

## 2017-12-18 RX ORDER — FUROSEMIDE 10 MG/ML
20 INJECTION INTRAMUSCULAR; INTRAVENOUS ONCE
Status: COMPLETED | OUTPATIENT
Start: 2017-12-18 | End: 2017-12-18

## 2017-12-18 RX ORDER — ACETAMINOPHEN 500 MG
1000 TABLET ORAL ONCE
Status: COMPLETED | OUTPATIENT
Start: 2017-12-18 | End: 2017-12-18

## 2017-12-18 RX ADMIN — FUROSEMIDE 20 MG: 10 INJECTION, SOLUTION INTRAMUSCULAR; INTRAVENOUS at 11:46

## 2017-12-18 RX ADMIN — ACETAMINOPHEN 1000 MG: 500 TABLET ORAL at 09:04

## 2018-01-08 ENCOUNTER — INFUSION (OUTPATIENT)
Dept: ONCOLOGY | Facility: HOSPITAL | Age: 83
End: 2018-01-08

## 2018-01-08 VITALS
TEMPERATURE: 98.7 F | BODY MASS INDEX: 24.66 KG/M2 | DIASTOLIC BLOOD PRESSURE: 41 MMHG | WEIGHT: 134 LBS | HEART RATE: 76 BPM | RESPIRATION RATE: 16 BRPM | SYSTOLIC BLOOD PRESSURE: 113 MMHG | HEIGHT: 62 IN

## 2018-01-08 DIAGNOSIS — D64.9 ANEMIA, UNSPECIFIED TYPE: Primary | Chronic | ICD-10-CM

## 2018-01-08 LAB
ABO GROUP BLD: NORMAL
BLD GP AB SCN SERPL QL: NEGATIVE
HCT VFR BLD AUTO: 29.6 % (ref 34.5–44)
HGB BLD-MCNC: 9.3 G/DL (ref 11.5–15.5)
RH BLD: NEGATIVE

## 2018-01-08 PROCEDURE — P9016 RBC LEUKOCYTES REDUCED: HCPCS

## 2018-01-08 PROCEDURE — 85018 HEMOGLOBIN: CPT

## 2018-01-08 PROCEDURE — 86901 BLOOD TYPING SEROLOGIC RH(D): CPT

## 2018-01-08 PROCEDURE — 96374 THER/PROPH/DIAG INJ IV PUSH: CPT

## 2018-01-08 PROCEDURE — 85014 HEMATOCRIT: CPT

## 2018-01-08 PROCEDURE — 36430 TRANSFUSION BLD/BLD COMPNT: CPT

## 2018-01-08 PROCEDURE — 86850 RBC ANTIBODY SCREEN: CPT

## 2018-01-08 PROCEDURE — 86923 COMPATIBILITY TEST ELECTRIC: CPT

## 2018-01-08 PROCEDURE — 25010000002 FUROSEMIDE PER 20 MG: Performed by: PHYSICIAN ASSISTANT

## 2018-01-08 PROCEDURE — 86900 BLOOD TYPING SEROLOGIC ABO: CPT

## 2018-01-08 RX ORDER — FUROSEMIDE 10 MG/ML
20 INJECTION INTRAMUSCULAR; INTRAVENOUS ONCE
Status: COMPLETED | OUTPATIENT
Start: 2018-01-08 | End: 2018-01-08

## 2018-01-08 RX ORDER — ACETAMINOPHEN 500 MG
1000 TABLET ORAL ONCE
Status: COMPLETED | OUTPATIENT
Start: 2018-01-08 | End: 2018-01-08

## 2018-01-08 RX ADMIN — FUROSEMIDE 20 MG: 10 INJECTION, SOLUTION INTRAMUSCULAR; INTRAVENOUS at 13:32

## 2018-01-08 RX ADMIN — ACETAMINOPHEN 1000 MG: 500 TABLET ORAL at 09:55

## 2018-01-11 ENCOUNTER — INFUSION (OUTPATIENT)
Dept: ONCOLOGY | Facility: HOSPITAL | Age: 83
End: 2018-01-11

## 2018-01-11 VITALS
DIASTOLIC BLOOD PRESSURE: 47 MMHG | RESPIRATION RATE: 16 BRPM | HEIGHT: 62 IN | TEMPERATURE: 98.7 F | BODY MASS INDEX: 24.66 KG/M2 | WEIGHT: 134 LBS | HEART RATE: 73 BPM | SYSTOLIC BLOOD PRESSURE: 126 MMHG

## 2018-01-11 DIAGNOSIS — D64.9 ANEMIA, UNSPECIFIED TYPE: Primary | Chronic | ICD-10-CM

## 2018-01-11 LAB
ABO GROUP BLD: NORMAL
BLD GP AB SCN SERPL QL: NEGATIVE
HCT VFR BLD AUTO: 32.7 % (ref 34.5–44)
HGB BLD-MCNC: 10.3 G/DL (ref 11.5–15.5)
RH BLD: NEGATIVE

## 2018-01-11 PROCEDURE — 86923 COMPATIBILITY TEST ELECTRIC: CPT

## 2018-01-11 PROCEDURE — 96374 THER/PROPH/DIAG INJ IV PUSH: CPT

## 2018-01-11 PROCEDURE — 86900 BLOOD TYPING SEROLOGIC ABO: CPT

## 2018-01-11 PROCEDURE — P9016 RBC LEUKOCYTES REDUCED: HCPCS

## 2018-01-11 PROCEDURE — 86901 BLOOD TYPING SEROLOGIC RH(D): CPT

## 2018-01-11 PROCEDURE — 36430 TRANSFUSION BLD/BLD COMPNT: CPT

## 2018-01-11 PROCEDURE — 25010000002 FUROSEMIDE PER 20 MG: Performed by: INTERNAL MEDICINE

## 2018-01-11 PROCEDURE — 85018 HEMOGLOBIN: CPT

## 2018-01-11 PROCEDURE — 85014 HEMATOCRIT: CPT

## 2018-01-11 PROCEDURE — 86850 RBC ANTIBODY SCREEN: CPT

## 2018-01-11 RX ORDER — ACETAMINOPHEN 500 MG
500 TABLET ORAL ONCE
Status: COMPLETED | OUTPATIENT
Start: 2018-01-11 | End: 2018-01-11

## 2018-01-11 RX ORDER — FUROSEMIDE 10 MG/ML
20 INJECTION INTRAMUSCULAR; INTRAVENOUS ONCE
Status: COMPLETED | OUTPATIENT
Start: 2018-01-11 | End: 2018-01-11

## 2018-01-11 RX ADMIN — ACETAMINOPHEN 500 MG: 500 TABLET ORAL at 09:50

## 2018-01-11 RX ADMIN — FUROSEMIDE 20 MG: 10 INJECTION, SOLUTION INTRAMUSCULAR; INTRAVENOUS at 13:20

## 2018-01-29 ENCOUNTER — INFUSION (OUTPATIENT)
Dept: ONCOLOGY | Facility: HOSPITAL | Age: 83
End: 2018-01-29

## 2018-01-29 VITALS
SYSTOLIC BLOOD PRESSURE: 112 MMHG | HEART RATE: 80 BPM | DIASTOLIC BLOOD PRESSURE: 50 MMHG | TEMPERATURE: 98.8 F | RESPIRATION RATE: 16 BRPM

## 2018-01-29 DIAGNOSIS — D64.9 ANEMIA, UNSPECIFIED TYPE: Primary | ICD-10-CM

## 2018-01-29 LAB
ABO GROUP BLD: NORMAL
BLD GP AB SCN SERPL QL: NEGATIVE
HCT VFR BLD AUTO: 33.4 % (ref 34.5–44)
HGB BLD-MCNC: 10.5 G/DL (ref 11.5–15.5)
RH BLD: NEGATIVE

## 2018-01-29 PROCEDURE — 96374 THER/PROPH/DIAG INJ IV PUSH: CPT

## 2018-01-29 PROCEDURE — 85018 HEMOGLOBIN: CPT

## 2018-01-29 PROCEDURE — 86850 RBC ANTIBODY SCREEN: CPT

## 2018-01-29 PROCEDURE — 86901 BLOOD TYPING SEROLOGIC RH(D): CPT

## 2018-01-29 PROCEDURE — 36430 TRANSFUSION BLD/BLD COMPNT: CPT

## 2018-01-29 PROCEDURE — 86900 BLOOD TYPING SEROLOGIC ABO: CPT

## 2018-01-29 PROCEDURE — P9016 RBC LEUKOCYTES REDUCED: HCPCS

## 2018-01-29 PROCEDURE — 86923 COMPATIBILITY TEST ELECTRIC: CPT

## 2018-01-29 PROCEDURE — 25010000002 FUROSEMIDE PER 20 MG: Performed by: PHYSICIAN ASSISTANT

## 2018-01-29 PROCEDURE — 85014 HEMATOCRIT: CPT

## 2018-01-29 RX ORDER — ACETAMINOPHEN 500 MG
500 TABLET ORAL ONCE
Status: COMPLETED | OUTPATIENT
Start: 2018-01-29 | End: 2018-01-29

## 2018-01-29 RX ORDER — FUROSEMIDE 10 MG/ML
20 INJECTION INTRAMUSCULAR; INTRAVENOUS ONCE
Status: COMPLETED | OUTPATIENT
Start: 2018-01-29 | End: 2018-01-29

## 2018-01-29 RX ADMIN — ACETAMINOPHEN 500 MG: 500 TABLET ORAL at 09:15

## 2018-01-29 RX ADMIN — FUROSEMIDE 20 MG: 10 INJECTION, SOLUTION INTRAMUSCULAR; INTRAVENOUS at 13:29

## 2018-02-22 ENCOUNTER — INFUSION (OUTPATIENT)
Dept: ONCOLOGY | Facility: HOSPITAL | Age: 83
End: 2018-02-22

## 2018-02-22 VITALS
RESPIRATION RATE: 16 BRPM | WEIGHT: 131 LBS | DIASTOLIC BLOOD PRESSURE: 88 MMHG | HEIGHT: 62 IN | BODY MASS INDEX: 24.11 KG/M2 | HEART RATE: 72 BPM | SYSTOLIC BLOOD PRESSURE: 134 MMHG | TEMPERATURE: 98.3 F

## 2018-02-22 DIAGNOSIS — D64.9 ANEMIA, UNSPECIFIED TYPE: Primary | ICD-10-CM

## 2018-02-22 LAB
ABO GROUP BLD: NORMAL
BLD GP AB SCN SERPL QL: NEGATIVE
HCT VFR BLD AUTO: 31.1 % (ref 34.5–44)
HGB BLD-MCNC: 9.9 G/DL (ref 11.5–15.5)
RH BLD: NEGATIVE

## 2018-02-22 PROCEDURE — 86923 COMPATIBILITY TEST ELECTRIC: CPT

## 2018-02-22 PROCEDURE — 96374 THER/PROPH/DIAG INJ IV PUSH: CPT

## 2018-02-22 PROCEDURE — 86900 BLOOD TYPING SEROLOGIC ABO: CPT

## 2018-02-22 PROCEDURE — 86850 RBC ANTIBODY SCREEN: CPT

## 2018-02-22 PROCEDURE — 86901 BLOOD TYPING SEROLOGIC RH(D): CPT

## 2018-02-22 PROCEDURE — 25010000002 FUROSEMIDE PER 20 MG: Performed by: PHYSICIAN ASSISTANT

## 2018-02-22 PROCEDURE — 85018 HEMOGLOBIN: CPT

## 2018-02-22 PROCEDURE — 36430 TRANSFUSION BLD/BLD COMPNT: CPT

## 2018-02-22 PROCEDURE — 85014 HEMATOCRIT: CPT

## 2018-02-22 PROCEDURE — P9016 RBC LEUKOCYTES REDUCED: HCPCS

## 2018-02-22 RX ORDER — ACETAMINOPHEN 500 MG
1000 TABLET ORAL ONCE
Status: COMPLETED | OUTPATIENT
Start: 2018-02-22 | End: 2018-02-22

## 2018-02-22 RX ORDER — FUROSEMIDE 10 MG/ML
20 INJECTION INTRAMUSCULAR; INTRAVENOUS ONCE
Status: COMPLETED | OUTPATIENT
Start: 2018-02-22 | End: 2018-02-22

## 2018-02-22 RX ADMIN — ACETAMINOPHEN 1000 MG: 500 TABLET ORAL at 12:24

## 2018-02-22 RX ADMIN — FUROSEMIDE 20 MG: 10 INJECTION, SOLUTION INTRAMUSCULAR; INTRAVENOUS at 15:12

## 2018-03-08 ENCOUNTER — INFUSION (OUTPATIENT)
Dept: ONCOLOGY | Facility: HOSPITAL | Age: 83
End: 2018-03-08

## 2018-03-08 VITALS
WEIGHT: 132 LBS | BODY MASS INDEX: 24.29 KG/M2 | TEMPERATURE: 97.9 F | RESPIRATION RATE: 18 BRPM | HEART RATE: 69 BPM | SYSTOLIC BLOOD PRESSURE: 135 MMHG | DIASTOLIC BLOOD PRESSURE: 57 MMHG | HEIGHT: 62 IN

## 2018-03-08 DIAGNOSIS — D64.9 ANEMIA, UNSPECIFIED TYPE: Primary | Chronic | ICD-10-CM

## 2018-03-08 LAB
ABO GROUP BLD: NORMAL
BLD GP AB SCN SERPL QL: NEGATIVE
HCT VFR BLD AUTO: 30.5 % (ref 34.5–44)
HGB BLD-MCNC: 9.6 G/DL (ref 11.5–15.5)
RH BLD: NEGATIVE

## 2018-03-08 PROCEDURE — 86900 BLOOD TYPING SEROLOGIC ABO: CPT

## 2018-03-08 PROCEDURE — 36430 TRANSFUSION BLD/BLD COMPNT: CPT

## 2018-03-08 PROCEDURE — 86923 COMPATIBILITY TEST ELECTRIC: CPT

## 2018-03-08 PROCEDURE — P9016 RBC LEUKOCYTES REDUCED: HCPCS

## 2018-03-08 PROCEDURE — 85014 HEMATOCRIT: CPT

## 2018-03-08 PROCEDURE — 85018 HEMOGLOBIN: CPT

## 2018-03-08 PROCEDURE — 86901 BLOOD TYPING SEROLOGIC RH(D): CPT

## 2018-03-08 PROCEDURE — 25010000002 FUROSEMIDE PER 20 MG: Performed by: INTERNAL MEDICINE

## 2018-03-08 PROCEDURE — 96374 THER/PROPH/DIAG INJ IV PUSH: CPT

## 2018-03-08 PROCEDURE — 86850 RBC ANTIBODY SCREEN: CPT

## 2018-03-08 PROCEDURE — 96375 TX/PRO/DX INJ NEW DRUG ADDON: CPT

## 2018-03-08 RX ORDER — ACETAMINOPHEN 500 MG
1000 TABLET ORAL ONCE
Status: COMPLETED | OUTPATIENT
Start: 2018-03-08 | End: 2018-03-08

## 2018-03-08 RX ORDER — FUROSEMIDE 10 MG/ML
20 INJECTION INTRAMUSCULAR; INTRAVENOUS ONCE
Status: COMPLETED | OUTPATIENT
Start: 2018-03-08 | End: 2018-03-08

## 2018-03-08 RX ADMIN — ACETAMINOPHEN 1000 MG: 500 TABLET ORAL at 09:30

## 2018-03-08 RX ADMIN — FUROSEMIDE 20 MG: 10 INJECTION, SOLUTION INTRAMUSCULAR; INTRAVENOUS at 13:08

## 2018-03-26 ENCOUNTER — TELEPHONE (OUTPATIENT)
Dept: ONCOLOGY | Facility: CLINIC | Age: 83
End: 2018-03-26

## 2018-03-26 NOTE — TELEPHONE ENCOUNTER
----- Message from Candie Huynh sent at 3/26/2018  9:27 AM EDT -----  Regarding: [facility] - BLOOD TRANSFUSION PROCEDURES  Contact: [phone]  KINJAL, DAUGHTER OF PATIENT CALLED AND WANTS TO KNOW WHAT THE PROCEDURE IS WHEN SOMEONE NEEDS TO RECEIVE BLOOD EVERY TWO WEEKS.     CAN SHE GET HER BLOOD CHECKED AND GET THE BLOOD THE SAME DAY?

## 2018-03-26 NOTE — TELEPHONE ENCOUNTER
----- Message from Rahel Ma sent at 3/26/2018  1:36 PM EDT -----  Regarding: APT FOR FRIDAY  THE PATIENTS DAUGHTER CALLED BACK AND CX THE APT THAT YOU HAD US PUT ON FOR Friday. SHE SAID THEY ARENT COMING BACK. SHE DIDN'T LEAVE A NUMBER EITHER. TRIED ASKING HER WHY AND SHE JUST SAID THEYRE GOING TO CONTINUE TO SEE HER REGULAR DRJostin

## 2018-03-26 NOTE — TELEPHONE ENCOUNTER
VM left advising that dr Baca would like to see patient again in the office, and can discuss taking over her transfusions again from Dr Kumari.Can see her Friday @ 0830, and potentially check labs/transfuse that day depending on visit.

## 2018-04-10 ENCOUNTER — INFUSION (OUTPATIENT)
Dept: ONCOLOGY | Facility: HOSPITAL | Age: 83
End: 2018-04-10

## 2018-04-10 VITALS
WEIGHT: 130 LBS | RESPIRATION RATE: 18 BRPM | HEIGHT: 62 IN | DIASTOLIC BLOOD PRESSURE: 56 MMHG | TEMPERATURE: 98.8 F | BODY MASS INDEX: 23.92 KG/M2 | SYSTOLIC BLOOD PRESSURE: 126 MMHG | HEART RATE: 72 BPM

## 2018-04-10 DIAGNOSIS — D64.9 ANEMIA, UNSPECIFIED TYPE: Primary | Chronic | ICD-10-CM

## 2018-04-10 LAB
ABO GROUP BLD: NORMAL
BLD GP AB SCN SERPL QL: NEGATIVE
HCT VFR BLD AUTO: 29.5 % (ref 34.5–44)
HGB BLD-MCNC: 9.4 G/DL (ref 11.5–15.5)
RH BLD: NEGATIVE
T&S EXPIRATION DATE: NORMAL

## 2018-04-10 PROCEDURE — 86923 COMPATIBILITY TEST ELECTRIC: CPT

## 2018-04-10 PROCEDURE — 86900 BLOOD TYPING SEROLOGIC ABO: CPT

## 2018-04-10 PROCEDURE — 25010000002 FUROSEMIDE PER 20 MG: Performed by: PHYSICIAN ASSISTANT

## 2018-04-10 PROCEDURE — 36430 TRANSFUSION BLD/BLD COMPNT: CPT

## 2018-04-10 PROCEDURE — 96374 THER/PROPH/DIAG INJ IV PUSH: CPT

## 2018-04-10 PROCEDURE — 85018 HEMOGLOBIN: CPT

## 2018-04-10 PROCEDURE — 85014 HEMATOCRIT: CPT

## 2018-04-10 PROCEDURE — 86901 BLOOD TYPING SEROLOGIC RH(D): CPT

## 2018-04-10 PROCEDURE — P9016 RBC LEUKOCYTES REDUCED: HCPCS

## 2018-04-10 PROCEDURE — 86850 RBC ANTIBODY SCREEN: CPT

## 2018-04-10 RX ORDER — FUROSEMIDE 10 MG/ML
20 INJECTION INTRAMUSCULAR; INTRAVENOUS ONCE
Status: COMPLETED | OUTPATIENT
Start: 2018-04-10 | End: 2018-04-10

## 2018-04-10 RX ORDER — ACETAMINOPHEN 500 MG
1000 TABLET ORAL ONCE
Status: COMPLETED | OUTPATIENT
Start: 2018-04-10 | End: 2018-04-10

## 2018-04-10 RX ADMIN — ACETAMINOPHEN 1000 MG: 500 TABLET, FILM COATED ORAL at 09:31

## 2018-04-10 RX ADMIN — FUROSEMIDE 20 MG: 10 INJECTION, SOLUTION INTRAMUSCULAR; INTRAVENOUS at 12:33

## 2018-05-11 ENCOUNTER — INFUSION (OUTPATIENT)
Dept: ONCOLOGY | Facility: HOSPITAL | Age: 83
End: 2018-05-11

## 2018-05-11 VITALS
TEMPERATURE: 98.4 F | SYSTOLIC BLOOD PRESSURE: 122 MMHG | HEIGHT: 62 IN | BODY MASS INDEX: 23.92 KG/M2 | RESPIRATION RATE: 16 BRPM | WEIGHT: 130 LBS | HEART RATE: 74 BPM | DIASTOLIC BLOOD PRESSURE: 67 MMHG

## 2018-05-11 DIAGNOSIS — D64.9 ANEMIA, UNSPECIFIED TYPE: Primary | ICD-10-CM

## 2018-05-11 LAB
ABO GROUP BLD: NORMAL
BLD GP AB SCN SERPL QL: NEGATIVE
HCT VFR BLD AUTO: 29.2 % (ref 34.5–44)
HGB BLD-MCNC: 9.2 G/DL (ref 11.5–15.5)
RH BLD: NEGATIVE
T&S EXPIRATION DATE: NORMAL

## 2018-05-11 PROCEDURE — 86900 BLOOD TYPING SEROLOGIC ABO: CPT

## 2018-05-11 PROCEDURE — 25010000002 FUROSEMIDE PER 20 MG: Performed by: PHYSICIAN ASSISTANT

## 2018-05-11 PROCEDURE — 96374 THER/PROPH/DIAG INJ IV PUSH: CPT

## 2018-05-11 PROCEDURE — 36430 TRANSFUSION BLD/BLD COMPNT: CPT

## 2018-05-11 PROCEDURE — P9016 RBC LEUKOCYTES REDUCED: HCPCS

## 2018-05-11 PROCEDURE — 85018 HEMOGLOBIN: CPT

## 2018-05-11 PROCEDURE — 86923 COMPATIBILITY TEST ELECTRIC: CPT

## 2018-05-11 PROCEDURE — 85014 HEMATOCRIT: CPT

## 2018-05-11 PROCEDURE — 86850 RBC ANTIBODY SCREEN: CPT

## 2018-05-11 PROCEDURE — 86901 BLOOD TYPING SEROLOGIC RH(D): CPT

## 2018-05-11 RX ORDER — FUROSEMIDE 10 MG/ML
20 INJECTION INTRAMUSCULAR; INTRAVENOUS ONCE
Status: COMPLETED | OUTPATIENT
Start: 2018-05-11 | End: 2018-05-11

## 2018-05-11 RX ORDER — ACETAMINOPHEN 500 MG
500 TABLET ORAL ONCE
Status: COMPLETED | OUTPATIENT
Start: 2018-05-11 | End: 2018-05-11

## 2018-05-11 RX ADMIN — ACETAMINOPHEN 500 MG: 500 TABLET, FILM COATED ORAL at 08:58

## 2018-05-11 RX ADMIN — FUROSEMIDE 20 MG: 10 INJECTION, SOLUTION INTRAMUSCULAR; INTRAVENOUS at 12:45

## 2018-05-11 NOTE — PATIENT INSTRUCTIONS
Blood Transfusion, Care After  This sheet gives you information about how to care for yourself after your procedure. Your doctor may also give you more specific instructions. If you have problems or questions, contact your doctor.  Follow these instructions at home:  · Take over-the-counter and prescription medicines only as told by your doctor.  · Go back to your normal activities as told by your doctor.  · Follow instructions from your doctor about how to take care of the area where an IV tube was put into your vein (insertion site). Make sure you:  ¨ Wash your hands with soap and water before you change your bandage (dressing). If there is no soap and water, use hand .  ¨ Change your bandage as told by your doctor.  · Check your IV insertion site every day for signs of infection. Check for:  ¨ More redness, swelling, or pain.  ¨ More fluid or blood.  ¨ Warmth.  ¨ Pus or a bad smell.  Contact a doctor if:  · You have more redness, swelling, or pain around the IV insertion site..  · You have more fluid or blood coming from the IV insertion site.  · Your IV insertion site feels warm to the touch.  · You have pus or a bad smell coming from the IV insertion site.  · Your pee (urine) turns pink, red, or brown.  · You feel weak after doing your normal activities.  Get help right away if:  · You have signs of a serious allergic or body defense (immune) system reaction, including:  ¨ Itchiness.  ¨ Hives.  ¨ Trouble breathing.  ¨ Anxiety.  ¨ Pain in your chest or lower back.  ¨ Fever, flushing, and chills.  ¨ Fast pulse.  ¨ Rash.  ¨ Watery poop (diarrhea).  ¨ Throwing up (vomiting).  ¨ Dark pee.  ¨ Serious headache.  ¨ Dizziness.  ¨ Stiff neck.  ¨ Yellow color in your face or the white parts of your eyes (jaundice).  Summary  · After a blood transfusion, return to your normal activities as told by your doctor.  · Every day, check for signs of infection where the IV tube was put into your vein.  · Some signs of  infection are warm skin, more redness and pain, more fluid or blood, and pus or a bad smell where the needle went in.  · Contact your doctor if you feel weak or have any unusual symptoms.  This information is not intended to replace advice given to you by your health care provider. Make sure you discuss any questions you have with your health care provider.  Document Released: 01/08/2016 Document Revised: 08/11/2017 Document Reviewed: 08/11/2017  [employer] Interactive Patient Education © 2017 [employer]

## 2018-05-25 ENCOUNTER — INFUSION (OUTPATIENT)
Dept: ONCOLOGY | Facility: HOSPITAL | Age: 83
End: 2018-05-25

## 2018-05-25 VITALS
SYSTOLIC BLOOD PRESSURE: 122 MMHG | DIASTOLIC BLOOD PRESSURE: 53 MMHG | WEIGHT: 132 LBS | RESPIRATION RATE: 16 BRPM | HEIGHT: 62 IN | BODY MASS INDEX: 24.29 KG/M2 | TEMPERATURE: 97.9 F | HEART RATE: 75 BPM

## 2018-05-25 DIAGNOSIS — D64.9 ANEMIA, UNSPECIFIED TYPE: Primary | Chronic | ICD-10-CM

## 2018-05-25 LAB
ABO GROUP BLD: NORMAL
BLD GP AB SCN SERPL QL: NEGATIVE
RH BLD: NEGATIVE
T&S EXPIRATION DATE: NORMAL

## 2018-05-25 PROCEDURE — 86923 COMPATIBILITY TEST ELECTRIC: CPT

## 2018-05-25 PROCEDURE — 86901 BLOOD TYPING SEROLOGIC RH(D): CPT

## 2018-05-25 PROCEDURE — 86850 RBC ANTIBODY SCREEN: CPT

## 2018-05-25 PROCEDURE — 86900 BLOOD TYPING SEROLOGIC ABO: CPT

## 2018-05-25 PROCEDURE — 36430 TRANSFUSION BLD/BLD COMPNT: CPT

## 2018-05-25 PROCEDURE — 96374 THER/PROPH/DIAG INJ IV PUSH: CPT

## 2018-05-25 PROCEDURE — 25010000002 FUROSEMIDE PER 20 MG: Performed by: INTERNAL MEDICINE

## 2018-05-25 PROCEDURE — P9016 RBC LEUKOCYTES REDUCED: HCPCS

## 2018-05-25 RX ORDER — ACETAMINOPHEN 500 MG
1000 TABLET ORAL ONCE
Status: COMPLETED | OUTPATIENT
Start: 2018-05-25 | End: 2018-05-25

## 2018-05-25 RX ORDER — FUROSEMIDE 10 MG/ML
20 INJECTION INTRAMUSCULAR; INTRAVENOUS ONCE
Status: COMPLETED | OUTPATIENT
Start: 2018-05-25 | End: 2018-05-25

## 2018-05-25 RX ADMIN — FUROSEMIDE 20 MG: 10 INJECTION, SOLUTION INTRAMUSCULAR; INTRAVENOUS at 13:12

## 2018-05-25 RX ADMIN — ACETAMINOPHEN 1000 MG: 500 TABLET, FILM COATED ORAL at 09:50

## 2018-06-20 DIAGNOSIS — D64.9 ANEMIA, UNSPECIFIED TYPE: Primary | Chronic | ICD-10-CM

## 2018-06-20 RX ORDER — ACETAMINOPHEN 500 MG
1000 TABLET ORAL ONCE
Status: CANCELLED | OUTPATIENT
Start: 2018-06-20 | End: 2018-06-20

## 2018-06-20 RX ORDER — FUROSEMIDE 10 MG/ML
20 INJECTION INTRAMUSCULAR; INTRAVENOUS ONCE
Status: CANCELLED | OUTPATIENT
Start: 2018-06-20 | End: 2018-06-20

## 2018-06-21 ENCOUNTER — INFUSION (OUTPATIENT)
Dept: ONCOLOGY | Facility: HOSPITAL | Age: 83
End: 2018-06-21

## 2018-06-21 VITALS
HEART RATE: 71 BPM | RESPIRATION RATE: 16 BRPM | BODY MASS INDEX: 23.59 KG/M2 | TEMPERATURE: 99 F | SYSTOLIC BLOOD PRESSURE: 151 MMHG | DIASTOLIC BLOOD PRESSURE: 61 MMHG | WEIGHT: 129 LBS

## 2018-06-21 DIAGNOSIS — D64.9 ANEMIA, UNSPECIFIED TYPE: Primary | Chronic | ICD-10-CM

## 2018-06-21 LAB
ABO GROUP BLD: NORMAL
BLD GP AB SCN SERPL QL: NEGATIVE
HCT VFR BLD AUTO: 39 % (ref 34.5–44)
HGB BLD-MCNC: 12.4 G/DL (ref 11.5–15.5)
RH BLD: NEGATIVE
T&S EXPIRATION DATE: NORMAL

## 2018-06-21 PROCEDURE — 86901 BLOOD TYPING SEROLOGIC RH(D): CPT

## 2018-06-21 PROCEDURE — 85014 HEMATOCRIT: CPT

## 2018-06-21 PROCEDURE — 25010000002 FUROSEMIDE PER 20 MG: Performed by: INTERNAL MEDICINE

## 2018-06-21 PROCEDURE — 36430 TRANSFUSION BLD/BLD COMPNT: CPT

## 2018-06-21 PROCEDURE — P9016 RBC LEUKOCYTES REDUCED: HCPCS

## 2018-06-21 PROCEDURE — 86900 BLOOD TYPING SEROLOGIC ABO: CPT

## 2018-06-21 PROCEDURE — 96374 THER/PROPH/DIAG INJ IV PUSH: CPT

## 2018-06-21 PROCEDURE — 86923 COMPATIBILITY TEST ELECTRIC: CPT

## 2018-06-21 PROCEDURE — 86850 RBC ANTIBODY SCREEN: CPT

## 2018-06-21 PROCEDURE — 85018 HEMOGLOBIN: CPT

## 2018-06-21 RX ORDER — ACETAMINOPHEN 500 MG
1000 TABLET ORAL ONCE
Status: COMPLETED | OUTPATIENT
Start: 2018-06-21 | End: 2018-06-21

## 2018-06-21 RX ORDER — FUROSEMIDE 10 MG/ML
20 INJECTION INTRAMUSCULAR; INTRAVENOUS ONCE
Status: COMPLETED | OUTPATIENT
Start: 2018-06-21 | End: 2018-06-21

## 2018-06-21 RX ADMIN — FUROSEMIDE 20 MG: 10 INJECTION, SOLUTION INTRAMUSCULAR; INTRAVENOUS at 12:13

## 2018-06-21 RX ADMIN — ACETAMINOPHEN 1000 MG: 500 TABLET, FILM COATED ORAL at 10:15

## 2018-06-22 LAB
ABO + RH BLD: NORMAL
ABO + RH BLD: NORMAL
BH BB BLOOD EXPIRATION DATE: NORMAL
BH BB BLOOD EXPIRATION DATE: NORMAL
BH BB BLOOD TYPE BARCODE: 9500
BH BB BLOOD TYPE BARCODE: 9500
BH BB DISPENSE STATUS: NORMAL
BH BB DISPENSE STATUS: NORMAL
BH BB PRODUCT CODE: NORMAL
BH BB PRODUCT CODE: NORMAL
BH BB UNIT NUMBER: NORMAL
BH BB UNIT NUMBER: NORMAL
UNIT  ABO: NORMAL
UNIT  ABO: NORMAL
UNIT  RH: NORMAL
UNIT  RH: NORMAL

## 2018-07-20 ENCOUNTER — INFUSION (OUTPATIENT)
Dept: ONCOLOGY | Facility: HOSPITAL | Age: 83
End: 2018-07-20

## 2018-07-20 VITALS
SYSTOLIC BLOOD PRESSURE: 157 MMHG | DIASTOLIC BLOOD PRESSURE: 60 MMHG | RESPIRATION RATE: 20 BRPM | HEART RATE: 76 BPM | WEIGHT: 127 LBS | BODY MASS INDEX: 23.23 KG/M2 | TEMPERATURE: 98.9 F

## 2018-07-20 DIAGNOSIS — D64.9 ANEMIA, UNSPECIFIED TYPE: Chronic | ICD-10-CM

## 2018-07-20 DIAGNOSIS — D64.9 CHRONIC ANEMIA: Primary | ICD-10-CM

## 2018-07-20 PROCEDURE — 36430 TRANSFUSION BLD/BLD COMPNT: CPT

## 2018-07-20 PROCEDURE — P9016 RBC LEUKOCYTES REDUCED: HCPCS

## 2018-07-20 PROCEDURE — 25010000002 FUROSEMIDE PER 20 MG: Performed by: INTERNAL MEDICINE

## 2018-07-20 PROCEDURE — 86900 BLOOD TYPING SEROLOGIC ABO: CPT

## 2018-07-20 PROCEDURE — 86923 COMPATIBILITY TEST ELECTRIC: CPT

## 2018-07-20 PROCEDURE — 96374 THER/PROPH/DIAG INJ IV PUSH: CPT

## 2018-07-20 PROCEDURE — 86850 RBC ANTIBODY SCREEN: CPT

## 2018-07-20 PROCEDURE — 86901 BLOOD TYPING SEROLOGIC RH(D): CPT

## 2018-07-20 RX ORDER — ACETAMINOPHEN 500 MG
1000 TABLET ORAL ONCE
Status: COMPLETED | OUTPATIENT
Start: 2018-07-20 | End: 2018-07-20

## 2018-07-20 RX ORDER — FUROSEMIDE 10 MG/ML
20 INJECTION INTRAMUSCULAR; INTRAVENOUS ONCE
Status: COMPLETED | OUTPATIENT
Start: 2018-07-20 | End: 2018-07-20

## 2018-07-20 RX ADMIN — ACETAMINOPHEN 1000 MG: 500 TABLET, FILM COATED ORAL at 09:42

## 2018-07-20 RX ADMIN — FUROSEMIDE 20 MG: 10 INJECTION, SOLUTION INTRAMUSCULAR; INTRAVENOUS at 13:03

## 2018-08-01 ENCOUNTER — APPOINTMENT (OUTPATIENT)
Dept: ONCOLOGY | Facility: HOSPITAL | Age: 83
End: 2018-08-01

## 2018-08-01 DIAGNOSIS — D64.9 ANEMIA, UNSPECIFIED TYPE: Primary | Chronic | ICD-10-CM

## 2018-08-01 RX ORDER — FUROSEMIDE 10 MG/ML
20 INJECTION INTRAMUSCULAR; INTRAVENOUS ONCE
Status: CANCELLED | OUTPATIENT
Start: 2018-08-01 | End: 2018-08-01

## 2018-08-01 RX ORDER — ACETAMINOPHEN 500 MG
1000 TABLET ORAL ONCE
Status: CANCELLED | OUTPATIENT
Start: 2018-08-02 | End: 2018-08-02

## 2018-08-01 RX ORDER — SODIUM CHLORIDE 9 MG/ML
250 INJECTION, SOLUTION INTRAVENOUS AS NEEDED
Status: CANCELLED | OUTPATIENT
Start: 2018-08-01 | End: 2019-08-01

## 2018-08-02 ENCOUNTER — INFUSION (OUTPATIENT)
Dept: ONCOLOGY | Facility: HOSPITAL | Age: 83
End: 2018-08-02

## 2018-08-02 VITALS
WEIGHT: 127 LBS | HEART RATE: 91 BPM | HEIGHT: 62 IN | RESPIRATION RATE: 16 BRPM | TEMPERATURE: 98.7 F | DIASTOLIC BLOOD PRESSURE: 66 MMHG | BODY MASS INDEX: 23.37 KG/M2 | SYSTOLIC BLOOD PRESSURE: 135 MMHG

## 2018-08-02 DIAGNOSIS — D64.9 ANEMIA, UNSPECIFIED TYPE: Chronic | ICD-10-CM

## 2018-08-02 PROCEDURE — 86901 BLOOD TYPING SEROLOGIC RH(D): CPT

## 2018-08-02 PROCEDURE — 86923 COMPATIBILITY TEST ELECTRIC: CPT

## 2018-08-02 PROCEDURE — 86900 BLOOD TYPING SEROLOGIC ABO: CPT

## 2018-08-02 PROCEDURE — P9016 RBC LEUKOCYTES REDUCED: HCPCS

## 2018-08-02 PROCEDURE — 96374 THER/PROPH/DIAG INJ IV PUSH: CPT

## 2018-08-02 PROCEDURE — 86850 RBC ANTIBODY SCREEN: CPT

## 2018-08-02 PROCEDURE — 25010000002 FUROSEMIDE PER 20 MG: Performed by: INTERNAL MEDICINE

## 2018-08-02 PROCEDURE — 36430 TRANSFUSION BLD/BLD COMPNT: CPT

## 2018-08-02 RX ORDER — SODIUM CHLORIDE 9 MG/ML
250 INJECTION, SOLUTION INTRAVENOUS AS NEEDED
Status: DISCONTINUED | OUTPATIENT
Start: 2018-08-02 | End: 2018-08-02 | Stop reason: HOSPADM

## 2018-08-02 RX ORDER — FUROSEMIDE 10 MG/ML
20 INJECTION INTRAMUSCULAR; INTRAVENOUS ONCE
Status: COMPLETED | OUTPATIENT
Start: 2018-08-02 | End: 2018-08-02

## 2018-08-02 RX ORDER — ACETAMINOPHEN 500 MG
1000 TABLET ORAL ONCE
Status: COMPLETED | OUTPATIENT
Start: 2018-08-02 | End: 2018-08-02

## 2018-08-02 RX ADMIN — ACETAMINOPHEN 1000 MG: 500 TABLET, FILM COATED ORAL at 10:39

## 2018-08-02 RX ADMIN — FUROSEMIDE 20 MG: 10 INJECTION, SOLUTION INTRAMUSCULAR; INTRAVENOUS at 13:42

## 2018-08-28 ENCOUNTER — INFUSION (OUTPATIENT)
Dept: ONCOLOGY | Facility: HOSPITAL | Age: 83
End: 2018-08-28

## 2018-08-28 VITALS
TEMPERATURE: 99.2 F | HEART RATE: 91 BPM | SYSTOLIC BLOOD PRESSURE: 137 MMHG | BODY MASS INDEX: 23.59 KG/M2 | DIASTOLIC BLOOD PRESSURE: 63 MMHG | RESPIRATION RATE: 18 BRPM | WEIGHT: 129 LBS

## 2018-08-28 DIAGNOSIS — D64.9 ANEMIA, UNSPECIFIED TYPE: Primary | Chronic | ICD-10-CM

## 2018-08-28 PROCEDURE — 86900 BLOOD TYPING SEROLOGIC ABO: CPT

## 2018-08-28 PROCEDURE — P9016 RBC LEUKOCYTES REDUCED: HCPCS

## 2018-08-28 PROCEDURE — 96374 THER/PROPH/DIAG INJ IV PUSH: CPT

## 2018-08-28 PROCEDURE — 86850 RBC ANTIBODY SCREEN: CPT

## 2018-08-28 PROCEDURE — 36430 TRANSFUSION BLD/BLD COMPNT: CPT

## 2018-08-28 PROCEDURE — 86901 BLOOD TYPING SEROLOGIC RH(D): CPT

## 2018-08-28 PROCEDURE — 86923 COMPATIBILITY TEST ELECTRIC: CPT

## 2018-08-28 PROCEDURE — 25010000002 FUROSEMIDE PER 20 MG: Performed by: INTERNAL MEDICINE

## 2018-08-28 RX ORDER — FUROSEMIDE 10 MG/ML
20 INJECTION INTRAMUSCULAR; INTRAVENOUS ONCE
Status: COMPLETED | OUTPATIENT
Start: 2018-08-28 | End: 2018-08-28

## 2018-08-28 RX ORDER — ACETAMINOPHEN 500 MG
1000 TABLET ORAL ONCE
Status: COMPLETED | OUTPATIENT
Start: 2018-08-28 | End: 2018-08-28

## 2018-08-28 RX ADMIN — FUROSEMIDE 20 MG: 10 INJECTION, SOLUTION INTRAMUSCULAR; INTRAVENOUS at 11:27

## 2018-08-28 RX ADMIN — ACETAMINOPHEN 1000 MG: 500 TABLET, FILM COATED ORAL at 09:24

## 2018-09-13 ENCOUNTER — INFUSION (OUTPATIENT)
Dept: ONCOLOGY | Facility: HOSPITAL | Age: 83
End: 2018-09-13

## 2018-09-13 VITALS
HEART RATE: 84 BPM | DIASTOLIC BLOOD PRESSURE: 67 MMHG | BODY MASS INDEX: 23 KG/M2 | WEIGHT: 125 LBS | TEMPERATURE: 98.9 F | SYSTOLIC BLOOD PRESSURE: 140 MMHG | RESPIRATION RATE: 16 BRPM | HEIGHT: 62 IN

## 2018-09-13 DIAGNOSIS — D64.9 ANEMIA, UNSPECIFIED TYPE: Primary | ICD-10-CM

## 2018-09-13 PROCEDURE — 86901 BLOOD TYPING SEROLOGIC RH(D): CPT

## 2018-09-13 PROCEDURE — 86850 RBC ANTIBODY SCREEN: CPT

## 2018-09-13 PROCEDURE — 86923 COMPATIBILITY TEST ELECTRIC: CPT

## 2018-09-13 PROCEDURE — 36430 TRANSFUSION BLD/BLD COMPNT: CPT

## 2018-09-13 PROCEDURE — P9016 RBC LEUKOCYTES REDUCED: HCPCS

## 2018-09-13 PROCEDURE — 96374 THER/PROPH/DIAG INJ IV PUSH: CPT

## 2018-09-13 PROCEDURE — 86900 BLOOD TYPING SEROLOGIC ABO: CPT

## 2018-09-13 PROCEDURE — 25010000002 FUROSEMIDE PER 20 MG: Performed by: INTERNAL MEDICINE

## 2018-09-13 PROCEDURE — 96375 TX/PRO/DX INJ NEW DRUG ADDON: CPT

## 2018-09-13 RX ORDER — FUROSEMIDE 10 MG/ML
20 INJECTION INTRAMUSCULAR; INTRAVENOUS ONCE
Status: COMPLETED | OUTPATIENT
Start: 2018-09-13 | End: 2018-09-13

## 2018-09-13 RX ORDER — ACETAMINOPHEN 500 MG
500 TABLET ORAL ONCE
Status: COMPLETED | OUTPATIENT
Start: 2018-09-13 | End: 2018-09-13

## 2018-09-13 RX ORDER — SODIUM CHLORIDE 9 MG/ML
250 INJECTION, SOLUTION INTRAVENOUS AS NEEDED
Status: DISCONTINUED | OUTPATIENT
Start: 2018-09-13 | End: 2018-09-13 | Stop reason: HOSPADM

## 2018-09-13 RX ADMIN — SODIUM CHLORIDE 250 ML: 9 INJECTION, SOLUTION INTRAVENOUS at 11:30

## 2018-09-13 RX ADMIN — ACETAMINOPHEN 500 MG: 500 TABLET, FILM COATED ORAL at 11:50

## 2018-09-13 RX ADMIN — FUROSEMIDE 20 MG: 10 INJECTION, SOLUTION INTRAMUSCULAR; INTRAVENOUS at 13:46

## 2018-10-09 ENCOUNTER — APPOINTMENT (OUTPATIENT)
Dept: ONCOLOGY | Facility: HOSPITAL | Age: 83
End: 2018-10-09

## 2018-10-10 DIAGNOSIS — D64.9 ANEMIA, UNSPECIFIED TYPE: Primary | Chronic | ICD-10-CM

## 2018-10-10 RX ORDER — FUROSEMIDE 10 MG/ML
20 INJECTION INTRAMUSCULAR; INTRAVENOUS ONCE
Status: CANCELLED | OUTPATIENT
Start: 2018-10-10 | End: 2018-10-10

## 2018-10-10 RX ORDER — ACETAMINOPHEN 325 MG/1
650 TABLET ORAL ONCE
Status: CANCELLED | OUTPATIENT
Start: 2018-10-10 | End: 2018-10-10

## 2018-10-11 ENCOUNTER — INFUSION (OUTPATIENT)
Dept: ONCOLOGY | Facility: HOSPITAL | Age: 83
End: 2018-10-11

## 2018-10-11 VITALS
DIASTOLIC BLOOD PRESSURE: 65 MMHG | BODY MASS INDEX: 23.19 KG/M2 | SYSTOLIC BLOOD PRESSURE: 136 MMHG | HEART RATE: 69 BPM | RESPIRATION RATE: 16 BRPM | WEIGHT: 126 LBS | TEMPERATURE: 98.6 F | HEIGHT: 62 IN

## 2018-10-11 DIAGNOSIS — D64.9 ANEMIA, UNSPECIFIED TYPE: Chronic | ICD-10-CM

## 2018-10-11 LAB
ABO GROUP BLD: NORMAL
BLD GP AB SCN SERPL QL: NEGATIVE
HCT VFR BLD AUTO: 31.9 % (ref 34.5–44)
HGB BLD-MCNC: 10.6 G/DL (ref 11.5–15.5)
RH BLD: NEGATIVE
T&S EXPIRATION DATE: NORMAL

## 2018-10-11 PROCEDURE — 86901 BLOOD TYPING SEROLOGIC RH(D): CPT

## 2018-10-11 PROCEDURE — 86850 RBC ANTIBODY SCREEN: CPT

## 2018-10-11 PROCEDURE — 96374 THER/PROPH/DIAG INJ IV PUSH: CPT

## 2018-10-11 PROCEDURE — 86923 COMPATIBILITY TEST ELECTRIC: CPT

## 2018-10-11 PROCEDURE — 86900 BLOOD TYPING SEROLOGIC ABO: CPT

## 2018-10-11 PROCEDURE — 25010000002 FUROSEMIDE PER 20 MG: Performed by: INTERNAL MEDICINE

## 2018-10-11 PROCEDURE — P9016 RBC LEUKOCYTES REDUCED: HCPCS

## 2018-10-11 PROCEDURE — 85014 HEMATOCRIT: CPT

## 2018-10-11 PROCEDURE — 85018 HEMOGLOBIN: CPT

## 2018-10-11 PROCEDURE — 36430 TRANSFUSION BLD/BLD COMPNT: CPT

## 2018-10-11 RX ORDER — ACETAMINOPHEN 325 MG/1
650 TABLET ORAL ONCE
Status: COMPLETED | OUTPATIENT
Start: 2018-10-11 | End: 2018-10-11

## 2018-10-11 RX ORDER — FUROSEMIDE 10 MG/ML
20 INJECTION INTRAMUSCULAR; INTRAVENOUS ONCE
Status: COMPLETED | OUTPATIENT
Start: 2018-10-11 | End: 2018-10-11

## 2018-10-11 RX ADMIN — FUROSEMIDE 20 MG: 10 INJECTION, SOLUTION INTRAMUSCULAR; INTRAVENOUS at 12:14

## 2018-10-11 RX ADMIN — ACETAMINOPHEN 650 MG: 325 TABLET ORAL at 10:06

## 2018-10-31 RX ORDER — FUROSEMIDE 10 MG/ML
20 INJECTION INTRAMUSCULAR; INTRAVENOUS ONCE
Status: CANCELLED | OUTPATIENT
Start: 2018-10-31 | End: 2018-10-31

## 2018-10-31 RX ORDER — ACETAMINOPHEN 500 MG
1000 TABLET ORAL ONCE
Status: CANCELLED | OUTPATIENT
Start: 2018-11-01

## 2018-11-01 ENCOUNTER — INFUSION (OUTPATIENT)
Dept: ONCOLOGY | Facility: HOSPITAL | Age: 83
End: 2018-11-01

## 2018-11-01 VITALS
BODY MASS INDEX: 22.82 KG/M2 | HEIGHT: 62 IN | DIASTOLIC BLOOD PRESSURE: 57 MMHG | SYSTOLIC BLOOD PRESSURE: 120 MMHG | HEART RATE: 81 BPM | WEIGHT: 124 LBS | TEMPERATURE: 98.2 F | RESPIRATION RATE: 16 BRPM

## 2018-11-01 DIAGNOSIS — D64.9 ANEMIA, UNSPECIFIED TYPE: Primary | ICD-10-CM

## 2018-11-01 PROCEDURE — 86850 RBC ANTIBODY SCREEN: CPT

## 2018-11-01 PROCEDURE — P9016 RBC LEUKOCYTES REDUCED: HCPCS

## 2018-11-01 PROCEDURE — 86900 BLOOD TYPING SEROLOGIC ABO: CPT

## 2018-11-01 PROCEDURE — 36430 TRANSFUSION BLD/BLD COMPNT: CPT

## 2018-11-01 PROCEDURE — 86923 COMPATIBILITY TEST ELECTRIC: CPT

## 2018-11-01 PROCEDURE — 86901 BLOOD TYPING SEROLOGIC RH(D): CPT

## 2018-11-01 RX ORDER — ACETAMINOPHEN 500 MG
1000 TABLET ORAL ONCE
Status: COMPLETED | OUTPATIENT
Start: 2018-11-01 | End: 2018-11-01

## 2018-11-01 RX ORDER — FUROSEMIDE 10 MG/ML
20 INJECTION INTRAMUSCULAR; INTRAVENOUS ONCE
Status: DISCONTINUED | OUTPATIENT
Start: 2018-11-01 | End: 2018-11-01 | Stop reason: HOSPADM

## 2018-11-01 RX ADMIN — ACETAMINOPHEN 650 MG: 325 TABLET ORAL at 10:24

## 2018-11-01 NOTE — PROGRESS NOTES
Notified Dr Kumari per nursing Iine that patient did not get IV Lasix.  IV  Infiltrated and  difficulty  getting IV placement.  She possibly took 2 of her 20 mg  lasix pills daughter reports this morning. Should have taken one.
